# Patient Record
Sex: FEMALE | Race: BLACK OR AFRICAN AMERICAN | NOT HISPANIC OR LATINO | Employment: FULL TIME | URBAN - METROPOLITAN AREA
[De-identification: names, ages, dates, MRNs, and addresses within clinical notes are randomized per-mention and may not be internally consistent; named-entity substitution may affect disease eponyms.]

---

## 2023-03-14 ENCOUNTER — TELEPHONE (OUTPATIENT)
Dept: PRIMARY CARE | Facility: CLINIC | Age: 38
End: 2023-03-14
Payer: COMMERCIAL

## 2023-03-14 NOTE — TELEPHONE ENCOUNTER
Pt calling for advice. Stated she had covid in January and her nose congestion has not gone away and is getting worse. Pt would like to know what JE would advise

## 2023-03-15 DIAGNOSIS — I10 ESSENTIAL (PRIMARY) HYPERTENSION: ICD-10-CM

## 2023-03-15 RX ORDER — LOSARTAN POTASSIUM 100 MG/1
TABLET ORAL
Qty: 30 TABLET | Refills: 5 | Status: SHIPPED | OUTPATIENT
Start: 2023-03-15 | End: 2023-09-15 | Stop reason: SDUPTHER

## 2023-03-16 LAB
FLU A RESULT: NOT DETECTED
FLU B RESULT: NOT DETECTED
SARS-COV-2 RESULT: NOT DETECTED

## 2023-04-02 PROBLEM — H90.3 ASNHL (ASYMMETRICAL SENSORINEURAL HEARING LOSS): Status: ACTIVE | Noted: 2023-04-02

## 2023-04-02 PROBLEM — N64.4 MASTALGIA: Status: ACTIVE | Noted: 2023-04-02

## 2023-04-02 PROBLEM — G47.30 SLEEP APNEA: Status: ACTIVE | Noted: 2023-04-02

## 2023-04-02 PROBLEM — H69.90 ETD (EUSTACHIAN TUBE DYSFUNCTION): Status: ACTIVE | Noted: 2023-04-02

## 2023-04-02 PROBLEM — K21.9 LARYNGOPHARYNGEAL REFLUX (LPR): Status: ACTIVE | Noted: 2023-04-02

## 2023-04-02 PROBLEM — J45.909 ASTHMA (HHS-HCC): Status: ACTIVE | Noted: 2023-04-02

## 2023-04-02 PROBLEM — R09.82 POST-NASAL DRIP: Status: ACTIVE | Noted: 2023-04-02

## 2023-04-02 PROBLEM — J31.0 RHINITIS: Status: ACTIVE | Noted: 2023-04-02

## 2023-04-02 PROBLEM — B96.89 BACTERIAL VAGINOSIS: Status: ACTIVE | Noted: 2023-04-02

## 2023-04-02 PROBLEM — R09.81 NASAL CONGESTION: Status: ACTIVE | Noted: 2023-04-02

## 2023-04-02 PROBLEM — J45.20 INTERMITTENT ASTHMA WITHOUT COMPLICATION (HHS-HCC): Status: ACTIVE | Noted: 2023-04-02

## 2023-04-02 PROBLEM — E55.9 VITAMIN D DEFICIENCY: Status: ACTIVE | Noted: 2023-04-02

## 2023-04-02 PROBLEM — R09.81 NASAL CONGESTION WITH RHINORRHEA: Status: ACTIVE | Noted: 2023-04-02

## 2023-04-02 PROBLEM — J00 NASOPHARYNGITIS: Status: ACTIVE | Noted: 2023-04-02

## 2023-04-02 PROBLEM — M25.519 SHOULDER ARTHRALGIA: Status: ACTIVE | Noted: 2023-04-02

## 2023-04-02 PROBLEM — D21.9 FIBROIDS: Status: ACTIVE | Noted: 2023-04-02

## 2023-04-02 PROBLEM — E66.01 MORBID OBESITY (MULTI): Status: ACTIVE | Noted: 2023-04-02

## 2023-04-02 PROBLEM — F19.959: Status: ACTIVE | Noted: 2023-04-02

## 2023-04-02 PROBLEM — S43.409A SHOULDER SPRAIN: Status: ACTIVE | Noted: 2023-04-02

## 2023-04-02 PROBLEM — E87.6 HYPOKALEMIA: Status: ACTIVE | Noted: 2023-04-02

## 2023-04-02 PROBLEM — R09.81 CONGESTION OF PARANASAL SINUS: Status: ACTIVE | Noted: 2023-04-02

## 2023-04-02 PROBLEM — A59.01 TRICHOMONAL VAGINITIS: Status: ACTIVE | Noted: 2023-04-02

## 2023-04-02 PROBLEM — D72.829 LEUKOCYTOSIS: Status: ACTIVE | Noted: 2023-04-02

## 2023-04-02 PROBLEM — L70.9 ACNE: Status: ACTIVE | Noted: 2023-04-02

## 2023-04-02 PROBLEM — L29.2 VULVAR ITCHING: Status: ACTIVE | Noted: 2023-04-02

## 2023-04-02 PROBLEM — Z97.5 IUD (INTRAUTERINE DEVICE) IN PLACE: Status: ACTIVE | Noted: 2023-04-02

## 2023-04-02 PROBLEM — I10 BENIGN ESSENTIAL HTN: Status: ACTIVE | Noted: 2023-04-02

## 2023-04-02 PROBLEM — R20.2 TINGLING OF BOTH UPPER EXTREMITIES: Status: ACTIVE | Noted: 2023-04-02

## 2023-04-02 PROBLEM — J10.1 INFLUENZA B: Status: ACTIVE | Noted: 2023-04-02

## 2023-04-02 PROBLEM — N60.19 FIBROCYSTIC BREAST: Status: ACTIVE | Noted: 2023-04-02

## 2023-04-02 PROBLEM — R13.10 DYSPHAGIA: Status: ACTIVE | Noted: 2023-04-02

## 2023-04-02 PROBLEM — R29.898 WEAKNESS OF BOTH ARMS: Status: ACTIVE | Noted: 2023-04-02

## 2023-04-02 PROBLEM — N76.0 BACTERIAL VAGINOSIS: Status: ACTIVE | Noted: 2023-04-02

## 2023-04-02 PROBLEM — R06.00 DYSPNEA: Status: ACTIVE | Noted: 2023-04-02

## 2023-04-02 PROBLEM — R10.2 PELVIC PAIN IN FEMALE: Status: ACTIVE | Noted: 2023-04-02

## 2023-04-02 PROBLEM — M53.82 NECK MUSCLE WEAKNESS: Status: ACTIVE | Noted: 2023-04-02

## 2023-04-02 PROBLEM — R05.9 COUGH: Status: ACTIVE | Noted: 2023-04-02

## 2023-04-02 PROBLEM — R03.0 ELEVATED BLOOD PRESSURE READING: Status: ACTIVE | Noted: 2023-04-02

## 2023-04-02 PROBLEM — J06.9 URI (UPPER RESPIRATORY INFECTION): Status: ACTIVE | Noted: 2023-04-02

## 2023-04-02 PROBLEM — R09.81 SINUS CONGESTION: Status: ACTIVE | Noted: 2023-04-02

## 2023-04-02 PROBLEM — J02.9 SORE THROAT: Status: ACTIVE | Noted: 2023-04-02

## 2023-04-02 PROBLEM — N89.8 VAGINAL ODOR: Status: ACTIVE | Noted: 2023-04-02

## 2023-04-02 PROBLEM — H65.91 RIGHT SEROUS OTITIS MEDIA: Status: ACTIVE | Noted: 2023-04-02

## 2023-04-02 PROBLEM — T83.32XA IUD STRINGS LOST: Status: ACTIVE | Noted: 2023-04-02

## 2023-04-02 PROBLEM — K62.5 RECTAL BLEEDING: Status: ACTIVE | Noted: 2023-04-02

## 2023-04-02 PROBLEM — K64.9 HEMORRHOIDS: Status: ACTIVE | Noted: 2023-04-02

## 2023-04-02 PROBLEM — D25.1 INTRAMURAL LEIOMYOMA OF UTERUS: Status: ACTIVE | Noted: 2023-04-02

## 2023-04-02 PROBLEM — G47.33 OSA (OBSTRUCTIVE SLEEP APNEA): Status: ACTIVE | Noted: 2023-04-02

## 2023-04-02 PROBLEM — G62.9 NEUROPATHY: Status: ACTIVE | Noted: 2023-04-02

## 2023-04-02 PROBLEM — J34.89 NASAL CONGESTION WITH RHINORRHEA: Status: ACTIVE | Noted: 2023-04-02

## 2023-04-02 PROBLEM — H91.20 SUDDEN HEARING LOSS: Status: ACTIVE | Noted: 2023-04-02

## 2023-04-02 PROBLEM — R29.3 POSTURE ABNORMALITY: Status: ACTIVE | Noted: 2023-04-02

## 2023-04-02 RX ORDER — FLUTICASONE FUROATE 27.5 UG/1
2 SPRAY, METERED NASAL
COMMUNITY
Start: 2022-06-02

## 2023-04-02 RX ORDER — LEVONORGESTREL 52 MG/1
INTRAUTERINE DEVICE INTRAUTERINE
COMMUNITY
Start: 2021-10-05

## 2023-04-02 RX ORDER — ALBUTEROL SULFATE 90 UG/1
1 AEROSOL, METERED RESPIRATORY (INHALATION) EVERY 4 HOURS PRN
COMMUNITY
Start: 2020-04-20

## 2023-04-02 RX ORDER — CLINDAMYCIN PHOSPHATE AND BENZOYL PEROXIDE 10; 50 MG/G; MG/G
GEL TOPICAL
COMMUNITY
Start: 2022-06-22

## 2023-04-02 RX ORDER — ERGOCALCIFEROL 1.25 MG/1
1 CAPSULE ORAL 2 TIMES WEEKLY
COMMUNITY
Start: 2020-05-05 | End: 2023-09-15 | Stop reason: SDUPTHER

## 2023-04-02 RX ORDER — AZELASTINE 1 MG/ML
2 SPRAY, METERED NASAL DAILY
COMMUNITY
Start: 2022-05-17 | End: 2023-04-11 | Stop reason: SDUPTHER

## 2023-04-02 RX ORDER — AMLODIPINE BESYLATE 5 MG/1
1 TABLET ORAL DAILY
COMMUNITY
Start: 2020-04-20 | End: 2023-04-18

## 2023-04-03 ENCOUNTER — APPOINTMENT (OUTPATIENT)
Dept: PRIMARY CARE | Facility: CLINIC | Age: 38
End: 2023-04-03
Payer: COMMERCIAL

## 2023-04-11 ENCOUNTER — OFFICE VISIT (OUTPATIENT)
Dept: PRIMARY CARE | Facility: CLINIC | Age: 38
End: 2023-04-11
Payer: COMMERCIAL

## 2023-04-11 VITALS
HEART RATE: 74 BPM | HEIGHT: 61 IN | WEIGHT: 230 LBS | BODY MASS INDEX: 43.43 KG/M2 | RESPIRATION RATE: 14 BRPM | DIASTOLIC BLOOD PRESSURE: 74 MMHG | OXYGEN SATURATION: 98 % | SYSTOLIC BLOOD PRESSURE: 126 MMHG

## 2023-04-11 DIAGNOSIS — J32.9 CHRONIC SINUSITIS, UNSPECIFIED LOCATION: ICD-10-CM

## 2023-04-11 DIAGNOSIS — R09.81 NASAL CONGESTION WITH RHINORRHEA: ICD-10-CM

## 2023-04-11 DIAGNOSIS — H69.93 DYSFUNCTION OF BOTH EUSTACHIAN TUBES: ICD-10-CM

## 2023-04-11 DIAGNOSIS — J34.89 NASAL CONGESTION WITH RHINORRHEA: ICD-10-CM

## 2023-04-11 DIAGNOSIS — H69.93 DYSFUNCTION OF BOTH EUSTACHIAN TUBES: Primary | ICD-10-CM

## 2023-04-11 PROCEDURE — 99213 OFFICE O/P EST LOW 20 MIN: CPT | Performed by: FAMILY MEDICINE

## 2023-04-11 RX ORDER — AZELASTINE 1 MG/ML
2 SPRAY, METERED NASAL DAILY
Qty: 30 ML | Refills: 3 | Status: SHIPPED | OUTPATIENT
Start: 2023-04-11 | End: 2023-04-12

## 2023-04-11 RX ORDER — LEVOFLOXACIN 500 MG/1
500 TABLET, FILM COATED ORAL DAILY
Qty: 10 TABLET | Refills: 0 | Status: SHIPPED | OUTPATIENT
Start: 2023-04-11 | End: 2023-04-21

## 2023-04-11 ASSESSMENT — ENCOUNTER SYMPTOMS
ENDOCRINE NEGATIVE: 1
SINUS PAIN: 1
CARDIOVASCULAR NEGATIVE: 1
ALLERGIC/IMMUNOLOGIC NEGATIVE: 1
PSYCHIATRIC NEGATIVE: 1
SINUS PRESSURE: 1
MUSCULOSKELETAL NEGATIVE: 1
CONSTITUTIONAL NEGATIVE: 1
GASTROINTESTINAL NEGATIVE: 1
RESPIRATORY NEGATIVE: 1
EYES NEGATIVE: 1
NEUROLOGICAL NEGATIVE: 1
HEMATOLOGIC/LYMPHATIC NEGATIVE: 1

## 2023-04-11 NOTE — PROGRESS NOTES
"Subjective   Patient ID: Toña Machuca is a 37 y.o. female who presents for Sinusitis.    HPI Pt presents today for sinus pain,congestion and pressure, she states this has been going on since January 2023. Pt is wondering if she can get some allergy testing to see if it might just be allergies.    Review of Systems   Constitutional: Negative.    HENT:  Positive for postnasal drip, sinus pressure and sinus pain.    Eyes: Negative.    Respiratory: Negative.     Cardiovascular: Negative.    Gastrointestinal: Negative.    Endocrine: Negative.    Genitourinary: Negative.    Musculoskeletal: Negative.    Skin: Negative.    Allergic/Immunologic: Negative.    Neurological: Negative.    Hematological: Negative.    Psychiatric/Behavioral: Negative.         Objective   /74 (BP Location: Left arm, Patient Position: Sitting, BP Cuff Size: Adult)   Pulse 74   Resp 14   Ht 1.549 m (5' 1\")   Wt 104 kg (230 lb)   LMP  (LMP Unknown)   SpO2 98%   BMI 43.46 kg/m²     Physical Exam CONSTITUTIONAL- NAD, Pt is A & O x3, Vital signs reviewed per chart.  General Appearance- normal , good hygiene,talks easily  EYES-PERRLA conjunctiva and lids- normal  EARS/NOSE-TM's normal, head normocephalic and atraumatic, naso pharynx-no nasal discharge, no trismus,  oropharynx- normal without exudate  NECK- supple, FROM, without mass  thyroid- NT, normal size, no nodule noted  LYMPH- WNL  CV- RRR without murmur, gallop, or other abnormality.  PULM- CTA bilaterally  Respiratory effort- normal respiratory effort , no retractions or nasal flaring  ABDOMEN- normoactive BS's , soft , NT, no hepatosplenomegaly palpated, or masses. No pulsatile masses noted  extremities no edema,NT  SKIN- no abnormal skin lesions on inspection or palpation  neuro- no focal deficits  PSYCH- pleasant, normal judgement and insight     Nasal mucosa is red and inflamed     Assessment/Plan   Problem List Items Addressed This Visit       ETD (eustachian tube " dysfunction) - Primary    Relevant Medications    azelastine (Astelin) 137 mcg (0.1 %) nasal spray    levoFLOXacin (Levaquin) 500 mg tablet    Other Relevant Orders    Respiratory Allergy Profile IgE    Nasal congestion with rhinorrhea    Relevant Medications    azelastine (Astelin) 137 mcg (0.1 %) nasal spray    levoFLOXacin (Levaquin) 500 mg tablet    Other Relevant Orders    Respiratory Allergy Profile IgE     Other Visit Diagnoses       Chronic sinusitis, unspecified location        Relevant Medications    azelastine (Astelin) 137 mcg (0.1 %) nasal spray    levoFLOXacin (Levaquin) 500 mg tablet    Other Relevant Orders    Respiratory Allergy Profile IgE             Scribe Attestation  By signing my name below, I, Erik Gaines DO  , Scribe   attest that this documentation has been prepared under the direction and in the presence of Erik Gaines DO.

## 2023-04-12 RX ORDER — AZELASTINE 1 MG/ML
2 SPRAY, METERED NASAL DAILY
Qty: 30 ML | Refills: 1 | Status: SHIPPED | OUTPATIENT
Start: 2023-04-12 | End: 2024-03-05 | Stop reason: WASHOUT

## 2023-04-15 DIAGNOSIS — I10 ESSENTIAL (PRIMARY) HYPERTENSION: ICD-10-CM

## 2023-04-18 RX ORDER — AMLODIPINE BESYLATE 5 MG/1
TABLET ORAL
Qty: 30 TABLET | Refills: 3 | Status: SHIPPED | OUTPATIENT
Start: 2023-04-18 | End: 2023-09-15 | Stop reason: SDUPTHER

## 2023-05-17 ENCOUNTER — OFFICE VISIT (OUTPATIENT)
Dept: PRIMARY CARE | Facility: CLINIC | Age: 38
End: 2023-05-17
Payer: COMMERCIAL

## 2023-05-17 VITALS
BODY MASS INDEX: 43.99 KG/M2 | HEART RATE: 74 BPM | HEIGHT: 61 IN | RESPIRATION RATE: 16 BRPM | WEIGHT: 233 LBS | SYSTOLIC BLOOD PRESSURE: 126 MMHG | DIASTOLIC BLOOD PRESSURE: 72 MMHG

## 2023-05-17 DIAGNOSIS — T14.8XXA AVULSION FRACTURE: ICD-10-CM

## 2023-05-17 DIAGNOSIS — G56.00 CARPAL TUNNEL SYNDROME, UNSPECIFIED LATERALITY: ICD-10-CM

## 2023-05-17 DIAGNOSIS — S63.649A GAMEKEEPER THUMB: ICD-10-CM

## 2023-05-17 PROBLEM — R09.81 SINUS CONGESTION: Status: RESOLVED | Noted: 2023-04-02 | Resolved: 2023-05-17

## 2023-05-17 PROBLEM — J10.1 INFLUENZA B: Status: RESOLVED | Noted: 2023-04-02 | Resolved: 2023-05-17

## 2023-05-17 PROBLEM — L29.2 VULVAR ITCHING: Status: RESOLVED | Noted: 2023-04-02 | Resolved: 2023-05-17

## 2023-05-17 PROBLEM — K21.9 LARYNGOPHARYNGEAL REFLUX (LPR): Status: RESOLVED | Noted: 2023-04-02 | Resolved: 2023-05-17

## 2023-05-17 PROBLEM — H91.20 SUDDEN HEARING LOSS: Status: RESOLVED | Noted: 2023-04-02 | Resolved: 2023-05-17

## 2023-05-17 PROBLEM — B96.89 BACTERIAL VAGINOSIS: Status: RESOLVED | Noted: 2023-04-02 | Resolved: 2023-05-17

## 2023-05-17 PROBLEM — T83.32XA IUD STRINGS LOST: Status: RESOLVED | Noted: 2023-04-02 | Resolved: 2023-05-17

## 2023-05-17 PROBLEM — F19.959: Status: RESOLVED | Noted: 2023-04-02 | Resolved: 2023-05-17

## 2023-05-17 PROBLEM — R09.81 NASAL CONGESTION: Status: RESOLVED | Noted: 2023-04-02 | Resolved: 2023-05-17

## 2023-05-17 PROBLEM — L70.9 ACNE: Status: RESOLVED | Noted: 2023-04-02 | Resolved: 2023-05-17

## 2023-05-17 PROBLEM — J02.9 SORE THROAT: Status: RESOLVED | Noted: 2023-04-02 | Resolved: 2023-05-17

## 2023-05-17 PROBLEM — R10.2 PELVIC PAIN IN FEMALE: Status: RESOLVED | Noted: 2023-04-02 | Resolved: 2023-05-17

## 2023-05-17 PROBLEM — K62.5 RECTAL BLEEDING: Status: RESOLVED | Noted: 2023-04-02 | Resolved: 2023-05-17

## 2023-05-17 PROBLEM — R09.81 NASAL CONGESTION WITH RHINORRHEA: Status: RESOLVED | Noted: 2023-04-02 | Resolved: 2023-05-17

## 2023-05-17 PROBLEM — R03.0 ELEVATED BLOOD PRESSURE READING: Status: RESOLVED | Noted: 2023-04-02 | Resolved: 2023-05-17

## 2023-05-17 PROBLEM — R09.82 POST-NASAL DRIP: Status: RESOLVED | Noted: 2023-04-02 | Resolved: 2023-05-17

## 2023-05-17 PROBLEM — M25.519 SHOULDER ARTHRALGIA: Status: RESOLVED | Noted: 2023-04-02 | Resolved: 2023-05-17

## 2023-05-17 PROBLEM — R29.3 POSTURE ABNORMALITY: Status: RESOLVED | Noted: 2023-04-02 | Resolved: 2023-05-17

## 2023-05-17 PROBLEM — R29.898 WEAKNESS OF BOTH ARMS: Status: RESOLVED | Noted: 2023-04-02 | Resolved: 2023-05-17

## 2023-05-17 PROBLEM — D21.9 FIBROIDS: Status: RESOLVED | Noted: 2023-04-02 | Resolved: 2023-05-17

## 2023-05-17 PROBLEM — R06.00 DYSPNEA: Status: RESOLVED | Noted: 2023-04-02 | Resolved: 2023-05-17

## 2023-05-17 PROBLEM — S43.409A SHOULDER SPRAIN: Status: RESOLVED | Noted: 2023-04-02 | Resolved: 2023-05-17

## 2023-05-17 PROBLEM — N76.0 BACTERIAL VAGINOSIS: Status: RESOLVED | Noted: 2023-04-02 | Resolved: 2023-05-17

## 2023-05-17 PROBLEM — A59.01 TRICHOMONAL VAGINITIS: Status: RESOLVED | Noted: 2023-04-02 | Resolved: 2023-05-17

## 2023-05-17 PROBLEM — H69.90 ETD (EUSTACHIAN TUBE DYSFUNCTION): Status: RESOLVED | Noted: 2023-04-02 | Resolved: 2023-05-17

## 2023-05-17 PROBLEM — M53.82 NECK MUSCLE WEAKNESS: Status: RESOLVED | Noted: 2023-04-02 | Resolved: 2023-05-17

## 2023-05-17 PROBLEM — J34.89 NASAL CONGESTION WITH RHINORRHEA: Status: RESOLVED | Noted: 2023-04-02 | Resolved: 2023-05-17

## 2023-05-17 PROBLEM — N60.19 FIBROCYSTIC BREAST: Status: RESOLVED | Noted: 2023-04-02 | Resolved: 2023-05-17

## 2023-05-17 PROBLEM — R05.9 COUGH: Status: RESOLVED | Noted: 2023-04-02 | Resolved: 2023-05-17

## 2023-05-17 PROBLEM — G47.33 OSA (OBSTRUCTIVE SLEEP APNEA): Status: RESOLVED | Noted: 2023-04-02 | Resolved: 2023-05-17

## 2023-05-17 PROBLEM — N64.4 MASTALGIA: Status: RESOLVED | Noted: 2023-04-02 | Resolved: 2023-05-17

## 2023-05-17 PROBLEM — N89.8 VAGINAL ODOR: Status: RESOLVED | Noted: 2023-04-02 | Resolved: 2023-05-17

## 2023-05-17 PROBLEM — J45.909 ASTHMA (HHS-HCC): Status: RESOLVED | Noted: 2023-04-02 | Resolved: 2023-05-17

## 2023-05-17 PROBLEM — D72.829 LEUKOCYTOSIS: Status: RESOLVED | Noted: 2023-04-02 | Resolved: 2023-05-17

## 2023-05-17 PROBLEM — R09.81 CONGESTION OF PARANASAL SINUS: Status: RESOLVED | Noted: 2023-04-02 | Resolved: 2023-05-17

## 2023-05-17 PROCEDURE — 99213 OFFICE O/P EST LOW 20 MIN: CPT | Performed by: FAMILY MEDICINE

## 2023-05-17 RX ORDER — SPIRONOLACTONE 50 MG/1
50 TABLET, FILM COATED ORAL DAILY
COMMUNITY
Start: 2023-04-27 | End: 2023-09-15

## 2023-05-17 ASSESSMENT — ENCOUNTER SYMPTOMS
RESPIRATORY NEGATIVE: 1
GASTROINTESTINAL NEGATIVE: 1
PSYCHIATRIC NEGATIVE: 1
MUSCULOSKELETAL NEGATIVE: 1
CARDIOVASCULAR NEGATIVE: 1
ENDOCRINE NEGATIVE: 1
NEUROLOGICAL NEGATIVE: 1
CONSTITUTIONAL NEGATIVE: 1
ALLERGIC/IMMUNOLOGIC NEGATIVE: 1
EYES NEGATIVE: 1
HEMATOLOGIC/LYMPHATIC NEGATIVE: 1

## 2023-05-17 NOTE — PROGRESS NOTES
"Subjective   Patient ID: Toña Machuca is a 37 y.o. female who presents for Left thumb.    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    Review of Systems   Constitutional: Negative.    HENT: Negative.     Eyes: Negative.    Respiratory: Negative.     Cardiovascular: Negative.    Gastrointestinal: Negative.    Endocrine: Negative.    Genitourinary: Negative.    Musculoskeletal: Negative.    Allergic/Immunologic: Negative.    Neurological: Negative.    Hematological: Negative.    Psychiatric/Behavioral: Negative.         Objective   /72 (BP Location: Left arm, Patient Position: Sitting, BP Cuff Size: Adult)   Pulse 74   Resp 16   Ht 1.549 m (5' 1\")   Wt 106 kg (233 lb)   BMI 44.02 kg/m²     Physical Exam CONSTITUTIONAL- NAD, Pt is A & O x3, Vital signs reviewed per chart.  General Appearance- normal , good hygiene,talks easily  EYES-PERRLA conjunctiva and lids- normal  EARS/NOSE-TM's normal, head normocephalic and atraumatic, naso pharynx-no nasal discharge, no trismus,  oropharynx- normal without exudate  NECK- supple, FROM, without mass  thyroid- NT, normal size, no nodule noted  LYMPH- WNL  CV- RRR without murmur, gallop, or other abnormality.  PULM- CTA bilaterally  Respiratory effort- normal respiratory effort , no retractions or nasal flaring  ABDOMEN- normoactive BS's , soft , NT, no hepatosplenomegaly palpated, or masses. No pulsatile masses noted  extremities no edema,NT  SKIN- no abnormal skin lesions on inspection or palpation  neuro- no focal deficits  PSYCH- pleasant, normal judgement and insight      Assessment/Plan   Problem List Items Addressed This Visit    None  Visit Diagnoses       Carpal tunnel syndrome, unspecified laterality        Gamekeeper thumb        Relevant Orders    Referral to " Orthopaedic Surgery             Scribe Attestation  By signing my name below, I, Bisi Jennings MA  , Scribe   attest that this documentation has been prepared under the direction and in the presence of Erik Gaines DO.

## 2023-09-15 ENCOUNTER — OFFICE VISIT (OUTPATIENT)
Dept: PRIMARY CARE | Facility: CLINIC | Age: 38
End: 2023-09-15
Payer: COMMERCIAL

## 2023-09-15 VITALS
BODY MASS INDEX: 42.86 KG/M2 | RESPIRATION RATE: 18 BRPM | HEART RATE: 74 BPM | WEIGHT: 227 LBS | SYSTOLIC BLOOD PRESSURE: 128 MMHG | OXYGEN SATURATION: 98 % | DIASTOLIC BLOOD PRESSURE: 78 MMHG | HEIGHT: 61 IN

## 2023-09-15 DIAGNOSIS — K21.9 GASTROESOPHAGEAL REFLUX DISEASE WITHOUT ESOPHAGITIS: ICD-10-CM

## 2023-09-15 DIAGNOSIS — E55.9 VITAMIN D DEFICIENCY: ICD-10-CM

## 2023-09-15 DIAGNOSIS — I10 ESSENTIAL (PRIMARY) HYPERTENSION: ICD-10-CM

## 2023-09-15 DIAGNOSIS — E66.01 CLASS 3 SEVERE OBESITY WITH SERIOUS COMORBIDITY AND BODY MASS INDEX (BMI) OF 40.0 TO 44.9 IN ADULT, UNSPECIFIED OBESITY TYPE (MULTI): Primary | ICD-10-CM

## 2023-09-15 PROBLEM — L91.0 KELOID: Status: ACTIVE | Noted: 2017-10-11

## 2023-09-15 PROBLEM — G56.03 BILATERAL CARPAL TUNNEL SYNDROME: Status: ACTIVE | Noted: 2023-09-15

## 2023-09-15 PROBLEM — H65.91 RIGHT SEROUS OTITIS MEDIA: Status: RESOLVED | Noted: 2023-04-02 | Resolved: 2023-09-15

## 2023-09-15 PROBLEM — J06.9 URI (UPPER RESPIRATORY INFECTION): Status: RESOLVED | Noted: 2023-04-02 | Resolved: 2023-09-15

## 2023-09-15 PROBLEM — J00 NASOPHARYNGITIS: Status: RESOLVED | Noted: 2023-04-02 | Resolved: 2023-09-15

## 2023-09-15 PROBLEM — R20.2 TINGLING OF BOTH UPPER EXTREMITIES: Status: RESOLVED | Noted: 2023-04-02 | Resolved: 2023-09-15

## 2023-09-15 PROBLEM — S63.629A COMPLETE TEAR OF ULNAR COLLATERAL LIGAMENT OF INTERPHALANGEAL JOINT OF THUMB: Status: ACTIVE | Noted: 2023-09-15

## 2023-09-15 PROBLEM — J31.0 RHINITIS: Status: RESOLVED | Noted: 2023-04-02 | Resolved: 2023-09-15

## 2023-09-15 PROCEDURE — 99214 OFFICE O/P EST MOD 30 MIN: CPT | Performed by: FAMILY MEDICINE

## 2023-09-15 RX ORDER — PANTOPRAZOLE SODIUM 40 MG/1
40 TABLET, DELAYED RELEASE ORAL DAILY
Qty: 30 TABLET | Refills: 5 | Status: SHIPPED | OUTPATIENT
Start: 2023-09-15 | End: 2024-04-29

## 2023-09-15 RX ORDER — LOSARTAN POTASSIUM 100 MG/1
100 TABLET ORAL DAILY
Qty: 30 TABLET | Refills: 11 | Status: SHIPPED | OUTPATIENT
Start: 2023-09-15

## 2023-09-15 RX ORDER — ERGOCALCIFEROL 1.25 MG/1
1 CAPSULE ORAL 2 TIMES WEEKLY
Qty: 24 CAPSULE | Refills: 3 | Status: SHIPPED | OUTPATIENT
Start: 2023-09-18

## 2023-09-15 RX ORDER — PHENTERMINE HYDROCHLORIDE 37.5 MG/1
37.5 TABLET ORAL
Qty: 30 TABLET | Refills: 0 | Status: SHIPPED | OUTPATIENT
Start: 2023-09-15

## 2023-09-15 RX ORDER — AMLODIPINE BESYLATE 5 MG/1
5 TABLET ORAL DAILY
Qty: 30 TABLET | Refills: 11 | Status: SHIPPED | OUTPATIENT
Start: 2023-09-15

## 2023-09-15 NOTE — PROGRESS NOTES
Subjective   Patient ID: Toña Machuca is a 38 y.o. female who presents for Weight Gain and Nausea.    HPI Pt would like to discuss something for weight loss today.    Nausea ,Pt states she is having some discomfort in her stomach.Pt states it sometimes she is nausous and would like to discuss this today,ongoing since July 2023,but increasing getting worse.    12 Systems have been reviewed as follows.  Constitutional: Fever, weight gain, weight loss, appetite change, night sweats, fatigue, chills.  Eyes : blurry, double vision, vision, loss, tearing, redness, pain, sensitivity to light, glaucoma.  Ears: nose, mouth, and throat: Hearing loss, ringing in the ears, ear pain, nasal congestion, nasal drainage, nosebleeds, mouth, throat, irritation tooth problem.  Cardiovascular: chest pain, pressure, heart racing, palpitations, sweating, leg swelling, high or low blood pressure  Pulmonary: Cough, yellow or green sputum, blood and sputum, shortness of breath, wheezing  Gastrointestinal: Nausea, vomiting, diarrhea, constipation, pain, blood in stool, or vomitus, heartburn, difficulty swallowing  Genitourinary: incontinence, abnormal bleeding, abnormal discharge, urinary frequency, urinary hesitancy, pain, impotence sexual problem, infection, urinary retention  Musculoskeletal: Pain, stiffness, joint, redness or warmth, arthritis, back pain, weakness, muscle wasting, sprain or fracture  Neuro: Weight weakness, dizziness, change in voice, change in taste change in vision, change in hearing, loss, or change of sensation, trouble walking, balance problems coordination problems, shaking, speech problem  Endocrine: cold or heat intolerance, blood sugar problem, weight gain or loss missed periods hot flashes, sweats, change in body hair, change in libido, increased thirst, increased urination  Heme/lymph: Swelling, bleeding, problem anemia, bruising, enlarged lymph nodes  Allergic/immunologic: H. plus nasal drip, watery  "itchy eyes, nasal drainage, immunosuppressed  The above were reviewed and noted negative except as noted in HPI and Problem List.      Objective   /78 (BP Location: Left arm, Patient Position: Sitting, BP Cuff Size: Small adult)   Pulse 74   Resp 18   Ht 1.549 m (5' 1\")   Wt 103 kg (227 lb)   SpO2 98%   BMI 42.89 kg/m²     Physical Exam CONSTITUTIONAL- NAD, Pt is A & O x3, Vital signs reviewed per chart.  General Appearance- normal , good hygiene,talks easily  EYES-PERRLA conjunctiva and lids- normal  EARS/NOSE-TM's normal, head normocephalic and atraumatic, naso pharynx-no nasal discharge, no trismus,  oropharynx- normal without exudate  NECK- supple, FROM, without mass  thyroid- NT, normal size, no nodule noted  LYMPH- WNL  CV- RRR without murmur, gallop, or other abnormality.  PULM- CTA bilaterally  Respiratory effort- normal respiratory effort , no retractions or nasal flaring  ABDOMEN- normoactive BS's , soft , NT, no hepatosplenomegaly palpated, or masses. No pulsatile masses noted  extremities no edema,NT  SKIN- no abnormal skin lesions on inspection or palpation  neuro- no focal deficits  PSYCH- pleasant, normal judgement and insight     Assessment/Plan   Problem List Items Addressed This Visit       Vitamin D deficiency    Relevant Medications    ergocalciferol (Vitamin D-2) 1.25 MG (00848 UT) capsule     Other Visit Diagnoses       Class 3 severe obesity with serious comorbidity and body mass index (BMI) of 40.0 to 44.9 in adult, unspecified obesity type (CMS/HCC)    -  Primary    Relevant Medications    phentermine (Adipex-P) 37.5 mg tablet    Essential (primary) hypertension        Relevant Medications    amLODIPine (Norvasc) 5 mg tablet    losartan (Cozaar) 100 mg tablet    Gastroesophageal reflux disease without esophagitis        Relevant Medications    pantoprazole (ProtoNix) 40 mg EC tablet             Scribe Attestation  By signing my name below, Erik RESENDIZ DO  , Scribe   attest " that this documentation has been prepared under the direction and in the presence of Erik Gaines DO.

## 2023-10-13 ENCOUNTER — APPOINTMENT (OUTPATIENT)
Dept: PRIMARY CARE | Facility: CLINIC | Age: 38
End: 2023-10-13
Payer: COMMERCIAL

## 2024-01-26 ENCOUNTER — OFFICE VISIT (OUTPATIENT)
Dept: PRIMARY CARE | Facility: CLINIC | Age: 39
End: 2024-01-26
Payer: COMMERCIAL

## 2024-01-26 VITALS
WEIGHT: 231 LBS | SYSTOLIC BLOOD PRESSURE: 126 MMHG | HEART RATE: 93 BPM | HEIGHT: 61 IN | OXYGEN SATURATION: 99 % | BODY MASS INDEX: 43.61 KG/M2 | DIASTOLIC BLOOD PRESSURE: 72 MMHG | TEMPERATURE: 98.1 F

## 2024-01-26 DIAGNOSIS — J01.90 ACUTE NON-RECURRENT SINUSITIS, UNSPECIFIED LOCATION: Primary | ICD-10-CM

## 2024-01-26 DIAGNOSIS — R05.1 ACUTE COUGH: ICD-10-CM

## 2024-01-26 PROCEDURE — 99213 OFFICE O/P EST LOW 20 MIN: CPT | Performed by: NURSE PRACTITIONER

## 2024-01-26 RX ORDER — DOXYCYCLINE 100 MG/1
100 CAPSULE ORAL 2 TIMES DAILY
Qty: 20 CAPSULE | Refills: 0 | Status: SHIPPED | OUTPATIENT
Start: 2024-01-26 | End: 2024-02-05

## 2024-01-26 ASSESSMENT — ENCOUNTER SYMPTOMS
DIZZINESS: 0
SORE THROAT: 0
MYALGIAS: 0
FEVER: 0
COUGH: 1
OCCASIONAL FEELINGS OF UNSTEADINESS: 0
SINUS PAIN: 1
LOSS OF SENSATION IN FEET: 0
PALPITATIONS: 0
SINUS PRESSURE: 1
HEADACHES: 1
CHILLS: 0
DEPRESSION: 0
WHEEZING: 0
SHORTNESS OF BREATH: 0

## 2024-01-26 NOTE — PATIENT INSTRUCTIONS
Today you were seen for a sinus infection.  Start antibiotic as directed and take until gone.  Increase rest and fluids.  Continue with OTC Flonase per package instructions.  Follow-up with your primary care provider in 1 week with any persisting symptoms, or sooner with any additional concerns.  If developing any severe or worsening symptoms, chest pain, trouble breathing, confusion or lethargy, proceed to emergency department for further evaluation.

## 2024-01-26 NOTE — PROGRESS NOTES
"Subjective   Patient ID: Toña Machuca is a 38 y.o. female who presents for Nasal Congestion and Ear Fullness.    HPI   Pt presents today with c/o nasal congestion and BL ear pain with fullness. Pt states she has tried astelin and flonase with no relief. Pt states that the saline nose spray she used helped but did not relive the pressure in her ears. Onset 1 week.     38-year-old female presents today complaining of nasal congestion, sinus pain/pressure and bilateral ear fullness with left ear pain.  She states that her symptoms have been persisting for more than 10 days.  She does feel that she is having some trouble hearing out of her ears.  She does have a mild cough.  No sore throat.  She denies any chest pain or trouble breathing.  She was initially running fevers, that has resolved.  She denies smoking or vaping.  She does report a history of asthma.  Her daughter tested positive for COVID, her test resulted negative.  She has been trying OTC Astelin, Flonase and saline nasal sprays with little relief.      Review of Systems   Constitutional:  Negative for chills and fever.   HENT:  Positive for congestion, ear pain, hearing loss, sinus pressure and sinus pain. Negative for sore throat.    Respiratory:  Positive for cough. Negative for shortness of breath and wheezing.    Cardiovascular:  Negative for chest pain and palpitations.   Musculoskeletal:  Negative for myalgias.   Neurological:  Positive for headaches. Negative for dizziness.       Objective   /72 (BP Location: Left arm, Patient Position: Sitting, BP Cuff Size: Large adult)   Pulse 93   Temp 36.7 °C (98.1 °F)   Ht 1.549 m (5' 1\")   Wt 105 kg (231 lb)   SpO2 99%   BMI 43.65 kg/m²     Physical Exam  Vitals and nursing note reviewed.   Constitutional:       General: She is not in acute distress.     Appearance: Normal appearance.   HENT:      Right Ear: Tympanic membrane normal.      Left Ear: Tympanic membrane normal.      Nose: " Congestion present.      Right Sinus: Maxillary sinus tenderness and frontal sinus tenderness present.      Left Sinus: Maxillary sinus tenderness present.      Mouth/Throat:      Pharynx: No oropharyngeal exudate or posterior oropharyngeal erythema.   Eyes:      Conjunctiva/sclera: Conjunctivae normal.   Cardiovascular:      Rate and Rhythm: Normal rate and regular rhythm.   Pulmonary:      Effort: Pulmonary effort is normal.      Breath sounds: Normal breath sounds. No wheezing, rhonchi or rales.   Neurological:      Mental Status: She is alert.   Psychiatric:         Mood and Affect: Mood normal.         Assessment/Plan   Problem List Items Addressed This Visit    None  Visit Diagnoses         Codes    Acute non-recurrent sinusitis, unspecified location    -  Primary J01.90    Relevant Medications    doxycycline (Vibramycin) 100 mg capsule    BMI 40.0-44.9, adult (CMS/[facility])     Z68.41    Acute cough     R05.1        Sinusitis: Will treat with doxycycline due to penicillin and sulfa allergies.  Increase rest and fluids.  Continue with Flonase.  Follow-up with PCP in 1 week with any persisting symptoms, or sooner with any additional concerns.

## 2024-02-23 ENCOUNTER — OFFICE VISIT (OUTPATIENT)
Dept: PRIMARY CARE | Facility: CLINIC | Age: 39
End: 2024-02-23
Payer: COMMERCIAL

## 2024-02-23 VITALS
DIASTOLIC BLOOD PRESSURE: 78 MMHG | WEIGHT: 234.6 LBS | OXYGEN SATURATION: 97 % | HEIGHT: 61 IN | RESPIRATION RATE: 16 BRPM | SYSTOLIC BLOOD PRESSURE: 125 MMHG | BODY MASS INDEX: 44.29 KG/M2 | HEART RATE: 91 BPM | TEMPERATURE: 97.7 F

## 2024-02-23 DIAGNOSIS — Z91.013 ALLERGY TO SHELLFISH: ICD-10-CM

## 2024-02-23 DIAGNOSIS — T78.40XA ALLERGIC REACTION, INITIAL ENCOUNTER: Primary | ICD-10-CM

## 2024-02-23 DIAGNOSIS — R68.89 SENSATION OF SWOLLEN THROAT: ICD-10-CM

## 2024-02-23 PROCEDURE — 99212 OFFICE O/P EST SF 10 MIN: CPT | Performed by: NURSE PRACTITIONER

## 2024-02-23 RX ORDER — EPINEPHRINE 0.3 MG/.3ML
1 INJECTION SUBCUTANEOUS AS NEEDED
Qty: 1 EACH | Refills: 1 | Status: SHIPPED | OUTPATIENT
Start: 2024-02-23 | End: 2024-02-24

## 2024-02-23 RX ORDER — METHYLPREDNISOLONE 4 MG/1
TABLET ORAL
Qty: 21 TABLET | Refills: 0 | Status: SHIPPED | OUTPATIENT
Start: 2024-02-23 | End: 2024-03-01

## 2024-02-23 ASSESSMENT — PATIENT HEALTH QUESTIONNAIRE - PHQ9
SUM OF ALL RESPONSES TO PHQ9 QUESTIONS 1 AND 2: 0
2. FEELING DOWN, DEPRESSED OR HOPELESS: NOT AT ALL
1. LITTLE INTEREST OR PLEASURE IN DOING THINGS: NOT AT ALL

## 2024-02-23 NOTE — PROGRESS NOTES
Subjective   Patient ID: Toña Machuca is a 38 y.o. female who is here today due to allergic reaction from shellfish.    HPI  Patient is a 38 y.o. female who CONSULTED AT [facility] today. Patient is with complaints of swelling and itching on the face (around the lips), intermittent sensation of throat swelling (gone now), and itching of the throat. Symptoms started last night after she stayed with friends at [employer]. She knew she has history of shellfish allergy so she did not ate nor drink anything from the restaurant. She has some intermittent shortness of breath since last night. Symptoms decreased after intake of Benadryl. She denies having chest pain, wheezing, change in voice, nor shortness of breath at present.    Review of Systems  General: no weight loss, generally healthy, no fatigue  Head: (+) swelling and itching on the face (around the lips), no headaches / sinus pain, no vertigo, no injury  Eyes: no diplopia, no tearing, no pain,   Ears: no change in hearing, no tinnitus, no bleeding, no vertigo  Mouth: (+) hx of intermittent sensation of throat swelling, (+) itching of the throat, no dental difficulties, no gingival bleeding, no sore throat, no loss of sense of taste  Nose: no congestion, no  discharge, no bleeding, no obstruction, no loss of sense of smell  Neck: no stiffness, no pain, no tenderness, no masses, no bruit  Pulmonary: (+) intermittent dyspnea, no wheezing, no hemoptysis, no cough  Cardiovascular: no chest pain, no palpitations, no syncope, no orthopnea  Gastrointestinal: no change in appetite, no dysphagia, no abdominal pains, no diarrhea, no emesis, no melena  Genito Urinary: no dysuria, no urinary urgency, no nocturia, no incontinence, no change in nature of urine  Musculoskeletal: no muscle ache, no joint pain, no limitation of range of motion, no paresthesia, no numbness  Constitutional: no fever, no chills, no night sweats    Objective    Physical Exam  General: ambulatory, in no acute distress  Head: normocephalic, no lesions  Eyes: pink palpebral conjunctiva, anicteric sclerae, PERRLA, EOM's full  Ears: clear external auditory canals, no ear discharge, no bleeding from the ears, tympanic membrane intact  Nose: nasal mucosa normal, no nasal discharge, no bleeding, no obstruction  Throat: clear, no exudate, no lesions  Mouth: (+) slight swelling, erythema, and pruritus of the skin around the mouth,  Neck: supple, no masses, no bruits  Chest: symmetrical chest expansion, no lagging, no retractions, clear breath sounds, no rales, no wheezes  Heart: normal rate, regular rhythm, no heaves, no thrills, no murmurs  Abdomen: soft, non-tender, normoactive bowel sounds, no mass palpated  Musculoskeletal: no limitation of range of motion, no paralysis, no deformity  Extremities: full and equal peripheral pulses, no edema,     Assessment/Plan   Problem List Items Addressed This Visit    None  Visit Diagnoses         Codes    Allergic reaction, initial encounter    -  Primary T78.40XA    Relevant Medications    methylPREDNISolone (Medrol Dospak) 4 mg tablets    EPINEPHrine (Epipen) 0.3 mg/0.3 mL injection syringe    Sensation of swollen throat     R68.89    Relevant Medications    methylPREDNISolone (Medrol Dospak) 4 mg tablets    EPINEPHrine (Epipen) 0.3 mg/0.3 mL injection syringe    Allergy to shellfish     Z91.013    Relevant Medications    methylPREDNISolone (Medrol Dospak) 4 mg tablets    EPINEPHrine (Epipen) 0.3 mg/0.3 mL injection syringe        DISCHARGE SUMMARY:   Patient was seen and examined. Diagnosis, treatment, treatment options, and possible complications of today's illness discussed and explained to patient. Patient to take medication/s associated with this visit. Advised avoidance of known or suspected allergen. Advised hypoallergenic diet. Advised to come back if with worsening or persistent symptoms. Advised to come back if there is  wheezing, change in voice quality, shortness of breath or chest pain. Patient verbalized understanding of plan of care.    Patient to come back in 7 - 10 days if needed for worsening symptoms.           SHIRIN Pablo-CNP 02/23/24 12:31 PM

## 2024-02-23 NOTE — PROGRESS NOTES
"Subjective   Patient ID: Toña Machuca is a 38 y.o. female who presents for Allergic Reaction.      Symptoms: food poisoning, swelling burning and itching from shell fish  Length of symptoms: this morning  OTC: benadryl  Related information:    HPI     Review of Systems    Objective   /78   Pulse 91   Temp 36.5 °C (97.7 °F)   Resp 16   Ht 1.549 m (5' 1\")   Wt 106 kg (234 lb 9.6 oz)   SpO2 97%   BMI 44.33 kg/m²     Physical Exam    Assessment/Plan          "

## 2024-02-24 ASSESSMENT — ENCOUNTER SYMPTOMS
DEPRESSION: 0
LOSS OF SENSATION IN FEET: 0
OCCASIONAL FEELINGS OF UNSTEADINESS: 0

## 2024-02-24 ASSESSMENT — PATIENT HEALTH QUESTIONNAIRE - PHQ9
1. LITTLE INTEREST OR PLEASURE IN DOING THINGS: NOT AT ALL
2. FEELING DOWN, DEPRESSED OR HOPELESS: NOT AT ALL
SUM OF ALL RESPONSES TO PHQ9 QUESTIONS 1 AND 2: 0
2. FEELING DOWN, DEPRESSED OR HOPELESS: NOT AT ALL
1. LITTLE INTEREST OR PLEASURE IN DOING THINGS: NOT AT ALL
SUM OF ALL RESPONSES TO PHQ9 QUESTIONS 1 AND 2: 0

## 2024-03-05 ENCOUNTER — OFFICE VISIT (OUTPATIENT)
Dept: OTOLARYNGOLOGY | Facility: CLINIC | Age: 39
End: 2024-03-05
Payer: COMMERCIAL

## 2024-03-05 ENCOUNTER — CLINICAL SUPPORT (OUTPATIENT)
Dept: AUDIOLOGY | Facility: CLINIC | Age: 39
End: 2024-03-05
Payer: COMMERCIAL

## 2024-03-05 VITALS
DIASTOLIC BLOOD PRESSURE: 83 MMHG | HEIGHT: 61 IN | WEIGHT: 237 LBS | TEMPERATURE: 98.2 F | SYSTOLIC BLOOD PRESSURE: 137 MMHG | BODY MASS INDEX: 44.75 KG/M2

## 2024-03-05 DIAGNOSIS — H90.41 SENSORINEURAL HEARING LOSS (SNHL) OF RIGHT EAR WITH UNRESTRICTED HEARING OF LEFT EAR: ICD-10-CM

## 2024-03-05 DIAGNOSIS — H61.22 IMPACTED CERUMEN OF LEFT EAR: ICD-10-CM

## 2024-03-05 DIAGNOSIS — J30.89 ALLERGIC RHINITIS DUE TO OTHER ALLERGIC TRIGGER, UNSPECIFIED SEASONALITY: Primary | ICD-10-CM

## 2024-03-05 DIAGNOSIS — H93.291 ABNORMAL AUDITORY PERCEPTION OF RIGHT EAR: ICD-10-CM

## 2024-03-05 DIAGNOSIS — H93.13 TINNITUS OF BOTH EARS: ICD-10-CM

## 2024-03-05 DIAGNOSIS — H90.3 ASNHL (ASYMMETRICAL SENSORINEURAL HEARING LOSS): Primary | ICD-10-CM

## 2024-03-05 PROCEDURE — 1036F TOBACCO NON-USER: CPT

## 2024-03-05 PROCEDURE — 99214 OFFICE O/P EST MOD 30 MIN: CPT

## 2024-03-05 PROCEDURE — 3075F SYST BP GE 130 - 139MM HG: CPT

## 2024-03-05 PROCEDURE — 69210 REMOVE IMPACTED EAR WAX UNI: CPT

## 2024-03-05 PROCEDURE — 92557 COMPREHENSIVE HEARING TEST: CPT | Performed by: AUDIOLOGIST

## 2024-03-05 PROCEDURE — 92550 TYMPANOMETRY & REFLEX THRESH: CPT | Performed by: AUDIOLOGIST

## 2024-03-05 PROCEDURE — 3008F BODY MASS INDEX DOCD: CPT

## 2024-03-05 PROCEDURE — 3079F DIAST BP 80-89 MM HG: CPT

## 2024-03-05 RX ORDER — TRIAMCINOLONE ACETONIDE 55 UG/1
1 SPRAY, METERED NASAL 2 TIMES DAILY
Qty: 16.5 G | Refills: 11 | Status: SHIPPED | OUTPATIENT
Start: 2024-03-05 | End: 2025-03-05

## 2024-03-05 ASSESSMENT — PAIN - FUNCTIONAL ASSESSMENT: PAIN_FUNCTIONAL_ASSESSMENT: 0-10

## 2024-03-05 ASSESSMENT — PATIENT HEALTH QUESTIONNAIRE - PHQ9
1. LITTLE INTEREST OR PLEASURE IN DOING THINGS: NOT AT ALL
SUM OF ALL RESPONSES TO PHQ9 QUESTIONS 1 AND 2: 0
2. FEELING DOWN, DEPRESSED OR HOPELESS: NOT AT ALL

## 2024-03-05 ASSESSMENT — PAIN SCALES - GENERAL: PAINLEVEL_OUTOF10: 0 - NO PAIN

## 2024-03-05 ASSESSMENT — ENCOUNTER SYMPTOMS: OCCASIONAL FEELINGS OF UNSTEADINESS: 0

## 2024-03-05 NOTE — PATIENT INSTRUCTIONS
NASACORT NASAL STEROID SPRAY INSTRUCTIONS: Please use the recommended topical nasal spray as directed. BE SURE TO POINT THE SPRAY TOWARDS THE CORNER OF THE EYE ON THE SAME SIDE NOSTRIL. This will ensure you are treating the appropriate parts of your nose that are swollen or inflamed. This medication will take upwards of one month before you notice full benefit. You MUST use it every day for it to be effective. This medication may be purchase over-the-counter or prescription may be submitted upon request.      SINUS/ NASAL IRRIGATION INSTRUCTIONS:  What is nasal irrigation and how does it help? Nasal irrigation is really as simple as running a gentle saline solution through your nasal passages and sinuses. Irrigation is very helpful in patients with sinus problems because it helps the nose to flush out anything that can cause continued irritation, swelling, and nasal blockage.  Following surgery, irrigation helps to wash out any blood clot or crusting and makes cleaning by the doctor in the clinic much more comfortable and pleasant. Irrigation is also one of the most effective ways to get topical medications directly to the nasal lining where they are needed.  How do I make the solution? You may purchase the NeilMed, or any other, over the counter sinus rinse system at your local pharmacy. Add 1 packet to the bottle and fill to the line with distilled, filtered, or boiled water at room temperature each time.  In some cases, your doctor may ask you to add certain medications to the bottle. Alternatively, you may use a bulb syringe, such as the kind used for infants, and follow the instructions below for making your own solution. You may make your own saline rinse solution by using boiled or distilled water and placing this in a clean 1 liter container. Add about 2-3 teaspoons of iodine free table salt (such as sea salt) and about 1 teaspoon of baking soda to the container. Let cool to room temperature before use. For  a smaller recipe, you can add 1/4 teaspoon of salt (no iodine) and 1/4 teaspoon of baking soda to 8 ounces of water at room temperature.    How do I irrigate my nose? Irrigation should be performed 1-2 times per day depending on your doctor's recommendations. Lean forward over a sink with your nose pointed slightly downwards and mouth open. Place the tip of the full irrigation bottle at the opening of the nostril and squeeze gently allowing the solution to enter the nose. You will feel the solution come out of the front of the nose and mouth and allow it to simply drain into the sink. Use half of the bottle for each nostril.   How do I clean the equipment? You should clean the bottle daily with soap and water to make sure you are not reintroducing bacteria into the nose. The bottles may be placed in the  as well.  Allow the bottle to cool and completely air dry before using again.  After a few months, you may consider purchasing a replacement bottle.  Helpful irrigation tips: If you are unable to tolerate the squeeze bottle, may substitute with [place]-Pot for a more gentle irrigation. It is okay to use Navage machine for irrigation if you prefer this method. Stop irrigating if you have to sneeze or cough.Do not inhale, speak, or swallow when irrigating as this could draw fluid into the middle ear or lung. At first you may find irrigation is uncomfortable. Try warming the fluid slightly or change the amount of squeezing pressure. After a while you should find irrigation to be quite pleasant. (Nasal Irrigation Video Tutorial Link:  https://www.sinusvideos.com/category/irrigations/)

## 2024-03-05 NOTE — PROGRESS NOTES
AUDIOLOGY ADULT AUDIOMETRIC EVALUATION      Name:  Toña Machuca  :  1985  Age:  38 y.o.  Date of Evaluation: 24    History:  Reason for visit:  Ms. Machuca was seen today as part of the visit with HARDIK Oliveira for an evaluation of hearing.   Chief Complaint   Patient presents with    Hearing Problem    Ear Fullness      Reported over two years ago she experienced a sudden hearing loss in the right ear, with recovery. Stated she has never really felt her right ear returned to her baseline and mentioned she continued to experience problems on the right side.   She has recently noticed some sound sensitivity in the right ear and stated she feels like she is hearing underwater. Mentioned when she has been listening to sounds, speech is slightly distorted. She recently wore an ear bud and noticed it was painful and uncomfortable in her ear and she was not able to hear well.   She has noticed some intermittent non-pulsatile non-bothersome ringing tinnitus at times in each ear.   Previous hearing testing performed on 22 indicated a mild sensorineural hearing loss in the right ear at 4,000 Hz.   Stated she experienced a possible infection last October and experienced Covid19 this past January.   Denied any current otalgia, dizziness/vertigo, ear surgery, recent falls, significant noise exposure, sinus/throat concerns, ear drainage, or sudden hearing loss.    EVALUATION     See Audiogram    RESULTS:    Otoscopic Evaluation:   Right Ear: Otoscopy revealed a clear healthy canal and a healthy tympanic membrane was visualized.   Left Ear:  Otoscopy revealed a clear healthy canal and a healthy tympanic membrane was visualized.     Immittance:  Immittance Measures: 226 Hz   Right Ear: Tympanometric testing revealed a normal type A tympanogram with normal middle ear pressure and normal static compliance.  Left Ear: Tympanometric testing revealed a normal type A tympanogram with normal  middle ear pressure and normal static compliance.    Ipsilateral acoustic reflexes were present at, 500-4,000 Hz, at expected sensation levels.  Ipsilateral acoustic reflexes were present at, 500-4,000 Hz, at expected sensation levels.    Test technique:  Pure Tone Audiometry via inserts     Reliability:   excellent    Pure Tone Audiometry:    Right Ear: Audiometric testing indicated normal peripheral hearing sensitivity from 125-2,000 Hz, sloping to a mild notched sensorineural hearing loss at 3,000 Hz, rising to normal hearing sensitivity above.  Left Ear:   Audiometric testing indicated normal peripheral hearing sensitivity from 125-8,000 Hz.      Speech Audiometry:   Right Ear:  Speech Reception Threshold (SRT) was obtained at 15 dBHL                 Word Recognition scores were excellent (100%) in quiet when words were presented at 55 dBHL  Left Ear:  Speech Reception Threshold (SRT) was obtained at 10 dBHL                 Word Recognition scores were excellent (100%) in quiet when words were presented at 50 dBHL  Testing was performed with recorded NU-6 speech words in quiet. Speech thresholds were in good agreement with the pure tone averages in each ear.     IMPRESSIONS:  Today's test results are consistent with normal peripheral hearing sensitivity, bilaterally.  The patient was counseled with regard to the findings.    When compared to the previous test results of 6/28/22 there has been no significant changes in hearing sensitivity. Note 6,000 Hz was previously not tested, but a comparison to 5/25/22 is essentially consistent.     RECOMMENDATIONS:  * Continue medical follow up with HARDIK Oliveira   * Retest as medically indicated, or sooner if a change in hearing sensitivity or tinnitus is noticed.   * Wear hearing protection while in the presence of loud sounds.   * Use tinnitus coping strategies as needed, such as sound apps on a smart phone, utilizing calming noise in the room, running a  fan at night, etc.   * Use effective communication strategies such as asking the speaker to gain attention prior to speaking, speaking in the same room, repeating words that were heard, etc.    PATIENT EDUCATION:   Discussed results and recommendations with the patient and a copy of the hearing test was provided.  Questions were addressed and the patient was encouraged to contact our department should concerns arise.  The patient was seen from

## 2024-03-05 NOTE — PROGRESS NOTES
Patient ID: Toña Machuca is a 38 y.o. female who presents for the evaluation of right hearing difficulty, bilateral non-pulsatile tinnitus, and nasal congestion.     PROVIDER IMPRESSIONS:  DIAGNOSES/PROBLEMS:  -Sensorineural hearing loss in right ear, unrestricted hearing in left ear  -Allergic rhinitis  -Cerumen impaction, left ear  -Tinnitus, bilateral     ASSESSMENT:   Toña Machuca is a pleasant 38 y.o. female who presents with symptoms of right hearing difficulty, bilateral non-pulsatile tinnitus, and nasal congestion. Based on the clinical information provided, symptoms and clinical exam findings are consistent with allergic rhinitis, left cerumen impaction, and ongoing right sensorineural hearing loss that has not progressed since sudden sensorineural hearing loss incident on May 2022. Using appropriate instrumentation, impacted cerumen successfully removed from the left EAC. Reassurance provided to patient that otologic exam today revealed no evidence of acute infection/inflammation in the external auditory canal (EAC) bilaterally and that tympanic membrane (TM) appears with no evidence of infection, effusion, retraction or perforation bilaterally.  Audiogram reviewed in detail with the patient, which revealed mild notched sensorineural hearing loss in the right ear that has remained essentially stable since prior audiogram in June 2022. Anterior rhinoscopy exam today revealed bilateral inferior nasal turbinate hypertrophy and bluish-pale color to the nasal mucosa with no evidence of intranasal masses, foreign bodies, bleeding or purulence suggestive of acute infection.     PLAN:  I recommend the patient starts to perform intranasal saline irrigations, either once daily or every other day for allergic rhinitis. I educated the patient that saline irrigations aid in flushing out residual mucus, crusts, allergens or other environmental irritants in the nasal cavity. I emphasized that this will help  clean the nasal cavity PRIOR to use of the medicated intranasal spray. I counseled the patient on appropriate administration of saline irrigations. I informed the patient that saline irrigation kits may be purchased over the counter.   I recommended changing to daily/consistent use of  triamcinolone (Nasocort) instead of Flonase nasal spray. I recommended either 2 puffs into each nostril daily, or 1 puff into each nostril twice daily for a consistent trial of at least 4-6 weeks. Patient counseled that this medication is a topical intranasal steroid nasal spray indicated to reduce nasal inflammation. Patient was educated on proper administration of nasal spray, by tilting their head down and pointing the applicator tip towards the lateral wall of the nose/corner of the eye on the same side. I advised patient to avoid pointing applicator toward the septum due to the risk of nasal bleeding.  Patient informed to discontinue the spray if they experience adverse side effects. This medication may be purchased over the counter; a prescription may be sent to the patient's pharmacy upon request.  I recommended referral to allergy/immunology to determine indication for allergy testing to identify triggers and properly direct avoidance of triggers and symptom management of allergies.   Patient advised to wear ear hearing protection while in the presence of loud sounds. The patient was also counseled to utilize use tinnitus coping strategies as needed, such as sound apps on a smartphone, utilizing calming noise in the room, running a fan at night, etc.    Follow-up: Patient may schedule for follow-up in 6-8 weeks to evaluate treatment outcomes for sinonasal symptoms, may likely perform nasal endoscopy exam at follow-up for ongoing/refractory symptoms. She may follow up in 1 year with audiogram as routine hearing surveillance. They may follow-up sooner, if needed. Patient is agreeable to this plan, all questions were answered to  patient's satisfaction.     Subjective   HPI: Toña Machuca is a 38 y.o. female who presents for the evaluation of bilateral tinnitus and right hearing difficulty. Reports a history sudden hearing loss in the right ear in May 2022, with recovery after receiving p.o. steroids and IT dexamethasone injection with Dr. Nini Cast at that time. Stated she has never really felt her right ear returned to her baseline and mentioned some sound sensitivity in the right ear when wearing headphones. Mentioned when she has been listening to sounds, speech is slightly distorted and describes sensation as if she is hearing underwater/muffled intermittently. She has noticed some intermittent non-pulsatile non-bothersome ringing tinnitus bilaterally which began approximately 5 months ago. States that she has had persistent nasal congestion for the past 5 months as well. She used flonase for nasal symptoms for the past 5 months but recently stopped using as she feels it was no longer helping. Mentions she has previously used azelastine nasal spray for nasal symptoms but states it makes her nose burn. She has taken p.o. zyrtec daily for the past 5 months which she states does improve sinonasal symptoms. Denies the presence of ear pain, ear fullness/pressure, ear itching, ear drainage, autophony, dizziness or vertigo.When asked about pertinent otologic history, the patient denies a history of recurrent ear infections, denies history of ear surgery, denies history of PE tube insertion, and denies history of prolonged/traumatic loud noise exposure. The patient does not insert Q-tips in the ear canals, and  denies insertion of other foreign objects into the ear canals. The patient denies history of wearing hearing aid devices.   The patient does not endorse a family history of hearing loss.     PATIENT HISTORY:  Past Medical History:   Diagnosis Date    Acute maxillary sinusitis, unspecified 01/26/2018    Acute maxillary sinusitis     Acute upper respiratory infection, unspecified 2019    Viral upper respiratory tract infection    Acute upper respiratory infection, unspecified 2019    Viral URI with cough    Acute vaginitis 2017    Bacterial vaginosis    Body mass index (BMI) 45.0-49.9, adult (CMS/[facility]) 02/15/2022    Body mass index (BMI) of 45.0 to 49.9 in adult    Candidiasis, unspecified     Yeast infection    Contact with and (suspected) exposure to covid-19 2020    Close exposure to COVID-19 virus    Encounter for immunization 2017    Influenza vaccination administered at current visit    History of uterine scar from previous surgery 2014    Status post     Influenza due to other identified influenza virus with other respiratory manifestations 2018    Influenza A    Irregular menstruation, unspecified 2014    Missed period    Localized enlarged lymph nodes 2018    Lymphadenopathy, cervical    Morbid (severe) obesity due to excess calories (CMS/[facility]) 10/06/2021    Obesity, morbid, BMI 40.0-49.9    Other conditions influencing health status 2014    History of pregnancy    Other sites of candidiasis 2014    Candida rash of groin    Other specified soft tissue disorders 2017    Left leg swelling    Pain in left leg 2017    Pain in central left lower extremity    Pain in throat 2018    Chronic throat pain    Pelvic and perineal pain 2016    Pelvic pain in female    Personal history of diseases of the blood and blood-forming organs and certain disorders involving the immune mechanism 2017    History of leukocytosis    Personal history of other benign neoplasm     History of uterine fibroid    Personal history of other complications of pregnancy, childbirth and the puerperium 2020    History of pregnancy induced hypertension    Personal history of other diseases of the circulatory system     History of hypertension    Personal history of  other diseases of the digestive system 03/03/2017    History of acute gastritis    Personal history of other diseases of the nervous system and sense organs     History of sleep apnea    Personal history of other diseases of the respiratory system 01/05/2022    History of sore throat    Personal history of other diseases of the respiratory system     History of asthma    Personal history of other diseases of the respiratory system 05/08/2022    History of acute sinusitis    Personal history of other diseases of the respiratory system 04/03/2019    History of acute pharyngitis    Personal history of other diseases of the respiratory system 11/11/2018    History of streptococcal pharyngitis    Personal history of other diseases of the respiratory system 02/23/2017    History of acute bronchitis    Personal history of other diseases of the respiratory system     Personal history of asthma    Personal history of other diseases of the respiratory system 03/02/2020    History of acute pharyngitis    Personal history of other infectious and parasitic diseases 12/16/2019    History of viral infection    Personal history of other specified conditions 02/06/2018    History of fever    Personal history of urinary (tract) infections 10/14/2016    History of urinary tract infection    Strain of other muscle(s) and tendon(s) at lower leg level, unspecified leg, initial encounter 12/27/2017    Strain of tibialis anterior muscle, initial encounter    Streptococcus, group A, as the cause of diseases classified elsewhere 02/07/2018    Group A streptococcal infection    Unspecified condition associated with female genital organs and menstrual cycle 04/16/2014    Genital symptoms, female    Unspecified disorder of synovium and tendon, right shoulder 07/20/2017    Disorder of right rotator cuff    Unspecified disorder of synovium and tendon, right shoulder 08/02/2017    Tendinopathy of rotator cuff, right    Unspecified maternal  "hypertension, unspecified trimester 2014    Hypertension in pregnancy, antepartum      Past Surgical History:   Procedure Laterality Date     SECTION, CLASSIC  2018     Section    MR HEAD ANGIO WO IV CONTRAST  2022    MR HEAD ANGIO WO IV CONTRAST 2022 [facility]    MR NECK ANGIO WO IV CONTRAST  2022    MR NECK ANGIO WO IV CONTRAST 2022 [facility]    OTHER SURGICAL HISTORY  2021    Uterine myomectomy    OTHER SURGICAL HISTORY  2022    Hemorrhoid rubber band ligation    PILONIDAL CYST DRAINAGE  2014    Pilonidal Cyst Resection      Allergies   Allergen Reactions    Shellfish Containing Products Unknown, Shortness of breath and Swelling    Sulfa (Sulfonamide Antibiotics) Anaphylaxis, Shortness of breath and Swelling    Bee Venom Protein (Honey Bee) Unknown    Latex Unknown    Other Other and Unknown    Penicillins Swelling     \"swelling\"    Prednisone Other        Current Outpatient Medications:     albuterol 90 mcg/actuation inhaler, Inhale 1 puff every 4 hours if needed., Disp: , Rfl:     amLODIPine (Norvasc) 5 mg tablet, Take 1 tablet (5 mg) by mouth once daily., Disp: 30 tablet, Rfl: 11    azelastine (Astelin) 137 mcg (0.1 %) nasal spray, ADMINISTER 2 SPRAYS INTO EACH NOSTRIL ONCE DAILY., Disp: 30 mL, Rfl: 1    clindamycin-benzoyl peroxide (Duac) 1.2-5% gel, Clindamycin Phos-Benzoyl Perox 1.2-5 % External Gel Quantity: 45  Refills: 0  Start: 2022, Disp: , Rfl:     EPINEPHrine (Epipen) 0.3 mg/0.3 mL injection syringe, Inject 0.3 mL (0.3 mg) into the muscle if needed for anaphylaxis for up to 1 day. Call 911 after use., Disp: 1 each, Rfl: 1    ergocalciferol (Vitamin D-2) 1.25 MG (89233 UT) capsule, Take 1 capsule (1,250 mcg) by mouth 2 times a week., Disp: 24 capsule, Rfl: 3    Flonase Sensimist 27.5 mcg/actuation nasal spray, Administer 2 sprays into each nostril once daily., Disp: , Rfl:     levonorgestrel (Mirena) 21 mcg/24 " hours (8 yrs) 52 mg IUD, USE AS DIRECTED, Disp: , Rfl:     losartan (Cozaar) 100 mg tablet, Take 1 tablet (100 mg) by mouth once daily., Disp: 30 tablet, Rfl: 11    pantoprazole (ProtoNix) 40 mg EC tablet, Take 1 tablet (40 mg) by mouth once daily. Do not crush, chew, or split., Disp: 30 tablet, Rfl: 5    phentermine (Adipex-P) 37.5 mg tablet, Take 1 tablet (37.5 mg) by mouth once daily in the morning. Take before meals., Disp: 30 tablet, Rfl: 0   Tobacco Use: Low Risk  (2/24/2024)    Patient History     Smoking Tobacco Use: Never     Smokeless Tobacco Use: Never     Passive Exposure: Not on file      Alcohol Use: Not on file      Social History     Substance and Sexual Activity   Drug Use Not Currently        Review of Systems   All other systems negative.     Objective   Visit Vitals  OB Status Having periods   Smoking Status Never        PHYSICAL EXAM:  General appearance: Appears well, well-nourished, well groomed. No acute distress.   Constitutional: No fever, chills, weight loss or weight gain.  Communication: Normal communication  Psychiatric: Oriented to person, place and time. Normal mood and affect.  Neurologic: Cranial nerves II-XII grossly intact and symmetric bilaterally.  Cardiovascular: Examination of peripheral vascular system shows no clubbing or cyanosis.  Respiratory: No respiratory distress increased work of breathing. Inspection of the chest with symmetric chest expansion and normal respiratory effort.  Skin: No head and neck rashes.  Head: Normocephalic. Atraumatic with no masses, lesions or scarring.  Face: Normal symmetry. No scars or deformities.  Eyes: Conjunctiva not edematous or erythematous. PERRLA  Neck: Supple and symmetric, trachea midline. Lymph nodes with no adenopathy.  Head: Normocephalic. Atraumatic with no masses, lesions or scarring.  Eyes: PERRL, EOMI, Conjunctiva is clear. No nystagmus.  Nose: External inspection of nose: No nasal lesions, lacerations or scars. No tenderness  on frontal or maxillary sinus palpation. Anterior rhinoscopy with limited visualization past the inferior turbinates: Septum is midline.  No septal perforation or lesions. No septal hematoma/ seroma.  No signs of bleeding.  No evidence of intranasal polyps.  Inferior turbinates are pale/boggy and hypertrophied.    Throat:  Floor of mouth is clear, no masses.  Tongue appears normal, no lesions or masses. Gums, gingiva, buccal mucosa appear pink and moist, no lesions. Teeth are in intact.  No obvious dental infections.  Peritonsillar regions appear symmetric without swelling.  Hard and soft palate appear normal, no obvious cleft. Uvula is midline.  Left Tonsil -- 2+, no exudates.  Right Tonsil -- 2+, no exudates.  Oropharynx: No lesions. Retropharyngeal wall is flat.  No postnasal drip.  Salivary Glands: Symmetric bilaterally.  No palpable masses.  No evidence of acute infection or salivary stones.  TMJ: Normal, no trismus.  Right Ear: External inspection of ear with no deformity, scars, or masses. Mastoid is nontender. External auditory canal is clear. TM is intact with no sign of infection, effusion, or retraction.  No perforation seen. Auto insufflation visible under microscopy.  Left Ear: External inspection of ear with no deformity, scars, or masses. Mastoid is nontender. External auditory canal is partially impacted with cerumen. Unable to visualize TM.     RESULTS:  I personally reviewed the patient's audiogram today from 3/5/24, which showed: Right ear with normal hearing through 2000 Hz, sloping to mild notched sensorineural hearing loss at 3,000 Hz, rising to normal hearing sensitivity above. Left ear with normal hearing across all frequencies. Normal tympanogram bilaterally. Excellent word discrimination bilaterally. Acoustic reflexes present right ipsilateral, and left ipsilateral.   When compared to the previous test results of 6/28/22 there has been no significant changes in hearing sensitivity. Note  6,000 Hz was previously not tested, but a comparison to 5/25/22 is essentially consistent.      I personally reviewed the patient's MRI IAC w/wo contrast from 6/16/22 which showed no focal lesions at the VII or VIII cranial nerve complex.     Procedures   EAR CLEANING PROCEDURE NOTE:  Indication: Cerumen impaction  Location: left ear canals  Procedure Note: The procedure was performed by the provider.  Visualization Instrument: A microscope with a #5 speculum was placed in the ear canals to visualize the ear canal debris.  Ear Cleaning Instrument and Outcome: Using the alligator forceps, a large amount of firm, brown cerumen was removed from the impacted EAC(s).  Post-Procedure/Microscopic Otologic Exam:  Right Ear--TM is intact with no sign of infection, effusion, or retraction.  No perforation seen. EAC is clear. Auto insufflation visible under microscopy.  Left Ear-- TM is intact with no sign of infection, effusion, or retraction.  No perforation seen. EAC is clear. Auto insufflation visible under microscopy.  Patient Status: The patient tolerated the procedure well.  Complications: There were no complications.     Chetna Mariee, APRN-CNP

## 2024-03-08 ENCOUNTER — OFFICE VISIT (OUTPATIENT)
Dept: OPHTHALMOLOGY | Facility: CLINIC | Age: 39
End: 2024-03-08
Payer: COMMERCIAL

## 2024-03-08 DIAGNOSIS — Q12.0 CONGENITAL CATARACT OF BOTH EYES: ICD-10-CM

## 2024-03-08 DIAGNOSIS — H52.13 MYOPIA OF BOTH EYES: Primary | ICD-10-CM

## 2024-03-08 PROCEDURE — 92015 DETERMINE REFRACTIVE STATE: CPT | Performed by: OPTOMETRIST

## 2024-03-08 PROCEDURE — 92004 COMPRE OPH EXAM NEW PT 1/>: CPT | Performed by: OPTOMETRIST

## 2024-03-08 ASSESSMENT — ENCOUNTER SYMPTOMS
CARDIOVASCULAR NEGATIVE: 0
HEMATOLOGIC/LYMPHATIC NEGATIVE: 0
EYES NEGATIVE: 0
ALLERGIC/IMMUNOLOGIC NEGATIVE: 0
MUSCULOSKELETAL NEGATIVE: 0
NEUROLOGICAL NEGATIVE: 0
GASTROINTESTINAL NEGATIVE: 0
CONSTITUTIONAL NEGATIVE: 0
RESPIRATORY NEGATIVE: 0
PSYCHIATRIC NEGATIVE: 0
ENDOCRINE NEGATIVE: 0

## 2024-03-08 ASSESSMENT — SLIT LAMP EXAM - LIDS
COMMENTS: NORMAL
COMMENTS: NORMAL

## 2024-03-08 ASSESSMENT — VISUAL ACUITY
OD_SC: 20/20
OD_SC: J1+
OS_SC: 20/20
METHOD: SNELLEN - LINEAR
OS_SC: J1+

## 2024-03-08 ASSESSMENT — CONF VISUAL FIELD
OS_SUPERIOR_TEMPORAL_RESTRICTION: 0
OS_SUPERIOR_NASAL_RESTRICTION: 0
OS_NORMAL: 1
OD_SUPERIOR_NASAL_RESTRICTION: 0
OD_INFERIOR_NASAL_RESTRICTION: 0
OD_SUPERIOR_TEMPORAL_RESTRICTION: 0
OD_NORMAL: 1
OS_INFERIOR_NASAL_RESTRICTION: 0
OD_INFERIOR_TEMPORAL_RESTRICTION: 0
OS_INFERIOR_TEMPORAL_RESTRICTION: 0

## 2024-03-08 ASSESSMENT — CUP TO DISC RATIO
OD_RATIO: 0.2
OS_RATIO: 0.2

## 2024-03-08 ASSESSMENT — EXTERNAL EXAM - LEFT EYE: OS_EXAM: NORMAL

## 2024-03-08 ASSESSMENT — TONOMETRY
OD_IOP_MMHG: 14
IOP_METHOD: GOLDMANN APPLANATION
OS_IOP_MMHG: 14

## 2024-03-08 ASSESSMENT — REFRACTION_MANIFEST
OS_SPHERE: -0.25
OD_SPHERE: -0.25

## 2024-03-08 ASSESSMENT — EXTERNAL EXAM - RIGHT EYE: OD_EXAM: NORMAL

## 2024-03-08 NOTE — PROGRESS NOTES
Mild congenital cortical cataracts OU.  Minimal myopia and recommend to defer glasses wear. The patient was asked to return to our clinic in one year or sooner if ocular or vision changes occur.

## 2024-04-23 ENCOUNTER — OFFICE VISIT (OUTPATIENT)
Dept: OTOLARYNGOLOGY | Facility: CLINIC | Age: 39
End: 2024-04-23
Payer: COMMERCIAL

## 2024-04-23 DIAGNOSIS — R09.81 NASAL CONGESTION: ICD-10-CM

## 2024-04-23 DIAGNOSIS — H57.9 PRURITUS OF BOTH EYES: ICD-10-CM

## 2024-04-23 DIAGNOSIS — J30.9 ALLERGIC RHINITIS, UNSPECIFIED SEASONALITY, UNSPECIFIED TRIGGER: Primary | ICD-10-CM

## 2024-04-23 ASSESSMENT — PATIENT HEALTH QUESTIONNAIRE - PHQ9
1. LITTLE INTEREST OR PLEASURE IN DOING THINGS: NOT AT ALL
2. FEELING DOWN, DEPRESSED OR HOPELESS: NOT AT ALL
SUM OF ALL RESPONSES TO PHQ9 QUESTIONS 1 AND 2: 0

## 2024-04-23 NOTE — PROGRESS NOTES
Patient ID: Toña Machuca is a 38 y.o. female who presents for subsequent evaluation of sinus/nose problems.     PROVIDER IMPRESSIONS:  DIAGNOSES/PROBLEMS:  -Allergic rhinitis  -Nasal congestion  -Pruritus/epiphora of both eyes    ASSESSMENT:   Toña Machuca is a pleasant 38 y.o. female who presents for subsequent evaluation of nasal congestion. Since last visit, symptoms are improved with use of Nasacort nasal spray and initiation of nasal saline irrigations. Exam findings today revealed reduced inferior turbinate hypertrophy bilaterally with resolution of pale/boggy appearance of the nasal turbinates previously seen on exam. Based on the clinical information provided, symptoms and clinical exam findings are consistent with resolving allergic rhinitis. Patient offered reassurance and counseling regarding exam findings today. I explained that given associated symptoms of epiphora and eye pruritus with outdoor environmental exposure that it would be reasonable to her to continue to pursue allergy/immunology testing to identify specific allergen triggers.     PLAN:  I recommended patient continues with daily, more consistent performance of nasal saline irrigations as previously recommended.   I recommended patient continues with daily/consistent use of Nasacort nasal spray as previously recommended. Prescription refill declined.   I recommended patient establishes care with allergy/immunology specialist at upcoming visit in June 2024 with Dr. Princess Rios. I explained that recommendations for oral antihistamine therapy should be further determined with allergy specialist.   Follow-up: Patient may schedule for follow-up as needed. Patient is agreeable to this plan, all questions were answered to patient's satisfaction.     Subjective   HPI: Toña Machuca is a 38 y.o. female who presents for 7 week follow-up to evaluate treatment outcomes for symptoms of allergic rhinitis. Since last visit on 3/5/24,  there has been significant improvement in symptoms of nasal congestion when she tends to be more consistent with topical nasal regimen recommendations. In review, prior recommendations included starting nasal saline irrigations and switching to Nasacort nasal spray, instead of Flonase, for allergic rhinitis. They report ongoing symptoms of eye pruritus/epiphora which have not worsened since the prior visit. Patient states that she typically performs nasal saline irrigations and administers Nasacort nasal spray approximately 2 times weekly instead of daily recommended use. States she notices the most benefit with performing nasal saline irrigations. Mentions that nasal congestion symptoms are most noticeable when she lays in bed at night and believes this may be associated with an allergy to dust mites. Symptoms of eye pruritus/epiphora are most noticeable when she walks outside. She denies current use of p.o. antihistamine o.t.c. therapy. Denies the presence of new symptoms including the presence of rhinorrhea, facial pain, facial pressure, nasal obstruction, loss of smell, or loss of taste. Patient denies epistaxis. Mentions that she has an upcoming visit with allergy/immunology specialist Dr. Rios for allergy/immunology testing in June 2024.     RECALL 3/5/24:  HPI: Toña Machuca is a 38 y.o. female who presents for the evaluation of bilateral tinnitus and right hearing difficulty. Reports a history sudden hearing loss in the right ear in May 2022, with recovery after receiving p.o. steroids and IT dexamethasone injection with Dr. Nini Cast at that time. Stated she has never really felt her right ear returned to her baseline and mentioned some sound sensitivity in the right ear when wearing headphones. Mentioned when she has been listening to sounds, speech is slightly distorted and describes sensation as if she is hearing underwater/muffled intermittently. She has noticed some intermittent  non-pulsatile non-bothersome ringing tinnitus bilaterally which began approximately 5 months ago. States that she has had persistent nasal congestion for the past 5 months as well. She used flonase for nasal symptoms for the past 5 months but recently stopped using as she feels it was no longer helping. Mentions she has previously used azelastine nasal spray for nasal symptoms but states it makes her nose burn. She has taken p.o. zyrtec daily for the past 5 months which she states does improve sinonasal symptoms. Denies the presence of ear pain, ear fullness/pressure, ear itching, ear drainage, autophony, dizziness or vertigo. When asked about pertinent otologic history, the patient denies a history of recurrent ear infections, denies history of ear surgery, denies history of PE tube insertion, and denies history of prolonged/traumatic loud noise exposure. The patient does not insert Q-tips in the ear canals, and  denies insertion of other foreign objects into the ear canals. The patient denies history of wearing hearing aid devices. The patient does not endorse a family history of hearing loss.   ASSESSMENT: Toña Machuca is a pleasant 38 y.o. female who presents with symptoms of right hearing difficulty, bilateral non-pulsatile tinnitus, and nasal congestion. Based on the clinical information provided, symptoms and clinical exam findings are consistent with allergic rhinitis, left cerumen impaction, and ongoing right sensorineural hearing loss that has not progressed since sudden sensorineural hearing loss incident on May 2022. Using appropriate instrumentation, impacted cerumen successfully removed from the left EAC. Reassurance provided to patient that otologic exam today revealed no evidence of acute infection/inflammation in the external auditory canal (EAC) bilaterally and that tympanic membrane (TM) appears with no evidence of infection, effusion, retraction or perforation bilaterally. Audiogram  reviewed in detail with the patient, which revealed mild notched sensorineural hearing loss in the right ear that has remained essentially stable since prior audiogram in June 2022. Anterior rhinoscopy exam today revealed bilateral inferior nasal turbinate hypertrophy and bluish-pale color to the nasal mucosa with no evidence of intranasal masses, foreign bodies, bleeding or purulence suggestive of acute infection.   PLAN:  I recommend the patient starts to perform intranasal saline irrigations, either once daily or every other day for allergic rhinitis. I educated the patient that saline irrigations aid in flushing out residual mucus, crusts, allergens or other environmental irritants in the nasal cavity. I emphasized that this will help clean the nasal cavity PRIOR to use of the medicated intranasal spray. I counseled the patient on appropriate administration of saline irrigations. I informed the patient that saline irrigation kits may be purchased over the counter.   I recommended changing to daily/consistent use of  triamcinolone (Nasocort) instead of Flonase nasal spray. I recommended either 2 puffs into each nostril daily, or 1 puff into each nostril twice daily for a consistent trial of at least 4-6 weeks. Patient counseled that this medication is a topical intranasal steroid nasal spray indicated to reduce nasal inflammation. Patient was educated on proper administration of nasal spray, by tilting their head down and pointing the applicator tip towards the lateral wall of the nose/corner of the eye on the same side. I advised patient to avoid pointing applicator toward the septum due to the risk of nasal bleeding.  Patient informed to discontinue the spray if they experience adverse side effects. This medication may be purchased over the counter; a prescription may be sent to the patient's pharmacy upon request.  I recommended referral to allergy/immunology to determine indication for allergy testing to  identify triggers and properly direct avoidance of triggers and symptom management of allergies.   Patient advised to wear ear hearing protection while in the presence of loud sounds. The patient was also counseled to utilize use tinnitus coping strategies as needed, such as sound apps on a smartphone, utilizing calming noise in the room, running a fan at night, etc.    Follow-up: Patient may schedule for follow-up in 6-8 weeks to evaluate treatment outcomes for sinonasal symptoms, may likely perform nasal endoscopy exam at follow-up for ongoing/refractory symptoms. She may follow up in 1 year with audiogram as routine hearing surveillance. They may follow-up sooner, if needed. Patient is agreeable to this plan, all questions were answered to patient's satisfaction.     PATIENT HISTORY:  Past Medical History:   Diagnosis Date    Acute maxillary sinusitis, unspecified 2018    Acute maxillary sinusitis    Acute upper respiratory infection, unspecified 2019    Viral upper respiratory tract infection    Acute upper respiratory infection, unspecified 2019    Viral URI with cough    Acute vaginitis 2017    Bacterial vaginosis    Body mass index (BMI) 45.0-49.9, adult (Multi) 02/15/2022    Body mass index (BMI) of 45.0 to 49.9 in adult    Candidiasis, unspecified     Yeast infection    Contact with and (suspected) exposure to covid-19 2020    Close exposure to COVID-19 virus    Encounter for immunization 2017    Influenza vaccination administered at current visit    History of uterine scar from previous surgery 2014    Status post     Influenza due to other identified influenza virus with other respiratory manifestations 2018    Influenza A    Irregular menstruation, unspecified 2014    Missed period    Localized enlarged lymph nodes 2018    Lymphadenopathy, cervical    Morbid (severe) obesity due to excess calories (Multi) 10/06/2021    Obesity, morbid,  BMI 40.0-49.9    Other conditions influencing health status 08/14/2014    History of pregnancy    Other sites of candidiasis 07/31/2014    Candida rash of groin    Other specified soft tissue disorders 12/27/2017    Left leg swelling    Pain in left leg 12/27/2017    Pain in central left lower extremity    Pain in throat 08/17/2018    Chronic throat pain    Pelvic and perineal pain 06/29/2016    Pelvic pain in female    Personal history of diseases of the blood and blood-forming organs and certain disorders involving the immune mechanism 11/14/2017    History of leukocytosis    Personal history of other benign neoplasm     History of uterine fibroid    Personal history of other complications of pregnancy, childbirth and the puerperium 04/03/2020    History of pregnancy induced hypertension    Personal history of other diseases of the circulatory system     History of hypertension    Personal history of other diseases of the digestive system 03/03/2017    History of acute gastritis    Personal history of other diseases of the nervous system and sense organs     History of sleep apnea    Personal history of other diseases of the respiratory system 01/05/2022    History of sore throat    Personal history of other diseases of the respiratory system     History of asthma    Personal history of other diseases of the respiratory system 05/08/2022    History of acute sinusitis    Personal history of other diseases of the respiratory system 04/03/2019    History of acute pharyngitis    Personal history of other diseases of the respiratory system 11/11/2018    History of streptococcal pharyngitis    Personal history of other diseases of the respiratory system 02/23/2017    History of acute bronchitis    Personal history of other diseases of the respiratory system     Personal history of asthma    Personal history of other diseases of the respiratory system 03/02/2020    History of acute pharyngitis    Personal history of  "other infectious and parasitic diseases 2019    History of viral infection    Personal history of other specified conditions 2018    History of fever    Personal history of urinary (tract) infections 10/14/2016    History of urinary tract infection    Strain of other muscle(s) and tendon(s) at lower leg level, unspecified leg, initial encounter 2017    Strain of tibialis anterior muscle, initial encounter    Streptococcus, group A, as the cause of diseases classified elsewhere 2018    Group A streptococcal infection    Unspecified condition associated with female genital organs and menstrual cycle 2014    Genital symptoms, female    Unspecified disorder of synovium and tendon, right shoulder 2017    Disorder of right rotator cuff    Unspecified disorder of synovium and tendon, right shoulder 2017    Tendinopathy of rotator cuff, right    Unspecified maternal hypertension, unspecified trimester ([facility]) 2014    Hypertension in pregnancy, antepartum      Past Surgical History:   Procedure Laterality Date     SECTION, CLASSIC  2018     Section    MR HEAD ANGIO WO IV CONTRAST  2022    MR HEAD ANGIO WO IV CONTRAST 2022 [facility]    MR NECK ANGIO WO IV CONTRAST  2022    MR NECK ANGIO WO IV CONTRAST 2022 [facility]    OTHER SURGICAL HISTORY  2021    Uterine myomectomy    OTHER SURGICAL HISTORY  2022    Hemorrhoid rubber band ligation    PILONIDAL CYST DRAINAGE  2014    Pilonidal Cyst Resection      Allergies   Allergen Reactions    Shellfish Containing Products Unknown, Shortness of breath and Swelling    Sulfa (Sulfonamide Antibiotics) Anaphylaxis, Shortness of breath and Swelling    Bee Venom Protein (Honey Bee) Unknown    Latex Unknown    Other Other and Unknown    Penicillins Swelling     \"swelling\"    Prednisone Other        Current Outpatient Medications:     albuterol 90 mcg/actuation " inhaler, Inhale 1 puff every 4 hours if needed., Disp: , Rfl:     amLODIPine (Norvasc) 5 mg tablet, Take 1 tablet (5 mg) by mouth once daily., Disp: 30 tablet, Rfl: 11    clindamycin-benzoyl peroxide (Duac) 1.2-5% gel, Clindamycin Phos-Benzoyl Perox 1.2-5 % External Gel Quantity: 45  Refills: 0  Start: 22-Jun-2022, Disp: , Rfl:     EPINEPHrine (Epipen) 0.3 mg/0.3 mL injection syringe, Inject 0.3 mL (0.3 mg) into the muscle if needed for anaphylaxis for up to 1 day. Call 911 after use., Disp: 1 each, Rfl: 1    ergocalciferol (Vitamin D-2) 1.25 MG (64159 UT) capsule, Take 1 capsule (1,250 mcg) by mouth 2 times a week., Disp: 24 capsule, Rfl: 3    Flonase Sensimist 27.5 mcg/actuation nasal spray, Administer 2 sprays into each nostril once daily., Disp: , Rfl:     levonorgestrel (Mirena) 21 mcg/24 hours (8 yrs) 52 mg IUD, USE AS DIRECTED, Disp: , Rfl:     losartan (Cozaar) 100 mg tablet, Take 1 tablet (100 mg) by mouth once daily., Disp: 30 tablet, Rfl: 11    pantoprazole (ProtoNix) 40 mg EC tablet, Take 1 tablet (40 mg) by mouth once daily. Do not crush, chew, or split., Disp: 30 tablet, Rfl: 5    phentermine (Adipex-P) 37.5 mg tablet, Take 1 tablet (37.5 mg) by mouth once daily in the morning. Take before meals., Disp: 30 tablet, Rfl: 0    triamcinolone (Nasacort) 55 mcg nasal inhaler, Administer 1 spray into each nostril 2 times a day., Disp: 16.5 g, Rfl: 11   Tobacco Use: Unknown (4/16/2024)    Received from [facility]    Patient History     Smoking Tobacco Use: Never     Smokeless Tobacco Use: Unknown     Passive Exposure: Not on file      Alcohol Use: Not on file      Social History     Substance and Sexual Activity   Drug Use Not Currently        Review of Systems   All other systems negative.     Objective   ENT FOCUSED PHYSICAL EXAM:   Right Ear: External inspection of ear with no deformity, scars, or masses. Mastoid is nontender. External auditory canal is clear. TM is intact with no sign of infection,  effusion, or retraction.  No perforation seen. Auto insufflation visible under microscopy.   Left Ear: External inspection of ear with no deformity, scars, or masses. Mastoid is nontender. External auditory canal is clear. TM is intact with no sign of infection, effusion, or retraction.  No perforation seen. Auto insufflation visible under microscopy.   Nose: External inspection of nose: No nasal lesions, lacerations or scars. No tenderness on frontal or maxillary sinus palpation. Anterior rhinoscopy with limited visualization past the inferior turbinates: Septum is midline.  No septal perforation or lesions. No septal hematoma/ seroma.  No signs of bleeding.  No evidence of intranasal polyps.  Inferior turbinates are hypertrophied.    Throat:  Floor of mouth is clear, no masses.  Tongue appears normal, no lesions or masses. Gums, gingiva, buccal mucosa appear pink and moist, no lesions. Teeth are in intact.  No obvious dental infections.  Peritonsillar regions appear symmetric without swelling.  Hard and soft palate appear normal, no obvious cleft. Uvula is midline.  Left Tonsil -- 2+, no exudates.  Right Tonsil -- 2+, no exudates.      Chetna Mariee, APRN-CNP

## 2024-04-29 DIAGNOSIS — K21.9 GASTROESOPHAGEAL REFLUX DISEASE WITHOUT ESOPHAGITIS: ICD-10-CM

## 2024-04-29 RX ORDER — PANTOPRAZOLE SODIUM 40 MG/1
40 TABLET, DELAYED RELEASE ORAL DAILY
Qty: 90 TABLET | Refills: 3 | Status: SHIPPED | OUTPATIENT
Start: 2024-04-29

## 2024-05-13 ENCOUNTER — OFFICE VISIT (OUTPATIENT)
Dept: PRIMARY CARE | Facility: CLINIC | Age: 39
End: 2024-05-13
Payer: COMMERCIAL

## 2024-05-13 VITALS
TEMPERATURE: 97.7 F | DIASTOLIC BLOOD PRESSURE: 80 MMHG | RESPIRATION RATE: 16 BRPM | SYSTOLIC BLOOD PRESSURE: 132 MMHG | WEIGHT: 240 LBS | OXYGEN SATURATION: 98 % | HEIGHT: 61 IN | HEART RATE: 78 BPM | BODY MASS INDEX: 45.31 KG/M2

## 2024-05-13 DIAGNOSIS — M54.50 ACUTE RIGHT-SIDED LOW BACK PAIN WITHOUT SCIATICA: Primary | ICD-10-CM

## 2024-05-13 DIAGNOSIS — M62.838 MUSCLE SPASM: ICD-10-CM

## 2024-05-13 DIAGNOSIS — E66.01 CLASS 3 SEVERE OBESITY WITH SERIOUS COMORBIDITY AND BODY MASS INDEX (BMI) OF 45.0 TO 49.9 IN ADULT, UNSPECIFIED OBESITY TYPE (MULTI): ICD-10-CM

## 2024-05-13 PROCEDURE — 99212 OFFICE O/P EST SF 10 MIN: CPT | Performed by: NURSE PRACTITIONER

## 2024-05-13 RX ORDER — DICLOFENAC SODIUM 75 MG/1
75 TABLET, DELAYED RELEASE ORAL 2 TIMES DAILY PRN
Qty: 60 TABLET | Refills: 0 | Status: SHIPPED | OUTPATIENT
Start: 2024-05-13 | End: 2024-07-12

## 2024-05-13 RX ORDER — METHOCARBAMOL 500 MG/1
500 TABLET, FILM COATED ORAL 4 TIMES DAILY PRN
Qty: 40 TABLET | Refills: 0 | Status: SHIPPED | OUTPATIENT
Start: 2024-05-13 | End: 2024-07-12

## 2024-05-13 ASSESSMENT — PATIENT HEALTH QUESTIONNAIRE - PHQ9
2. FEELING DOWN, DEPRESSED OR HOPELESS: NOT AT ALL
1. LITTLE INTEREST OR PLEASURE IN DOING THINGS: NOT AT ALL
SUM OF ALL RESPONSES TO PHQ9 QUESTIONS 1 AND 2: 0
1. LITTLE INTEREST OR PLEASURE IN DOING THINGS: NOT AT ALL
2. FEELING DOWN, DEPRESSED OR HOPELESS: NOT AT ALL
SUM OF ALL RESPONSES TO PHQ9 QUESTIONS 1 AND 2: 0

## 2024-05-13 ASSESSMENT — ENCOUNTER SYMPTOMS
OCCASIONAL FEELINGS OF UNSTEADINESS: 0
LOSS OF SENSATION IN FEET: 0
DEPRESSION: 0

## 2024-05-13 NOTE — PROGRESS NOTES
Subjective   Patient ID: Toña Machuca is a 38 y.o. female who is with chief complaint of right sided lower back pain.    HPI  Patient is a 38 y.o. female who CONSULTED AT [facility] today. Patient is with complaints of right sided lower back pain. Patient states condition started yesterday after suddenly standing up from sitting position. Patient states the pain is on the right side of her lower back, spasm / achy in character, wax and wane but always there, 7/10, aggravated by movement, and non radiating. she denies fever, chills, paresthesia, paralysis, nor change in the color of the skin or nails of involved extremity. she states she tried OTC medications which afforded only slight relief of symptoms.    Review of Systems  General: no weight loss, generally healthy, no fatigue  Head:  no headaches / sinus pain, no vertigo, no injury  Eyes: no diplopia, no tearing, no pain,   Ears: no change in hearing, no tinnitus, no bleeding, no vertigo  Mouth:  no dental difficulties, no gingival bleeding, no sore throat, no loss of sense of taste  Nose: no congestion, no  discharge, no bleeding, no obstruction, no loss of sense of smell  Neck: no stiffness, no pain, no tenderness, no masses, no bruit  Pulmonary: no dyspnea, no wheezing, no hemoptysis, no cough  Cardiovascular: no chest pain, no palpitations, no syncope, no orthopnea  Gastrointestinal: no change in appetite, no dysphagia, no abdominal pains, no diarrhea, no emesis, no melena  Genito Urinary: no dysuria, no urinary urgency, no nocturia, no incontinence, no change in nature of urine  Musculoskeletal: (+) right sided lower back pain, no limitation of range of motion, no paresthesia, no numbness  Constitutional: no fever, no chills, no night sweats    Objective   Physical Exam  General: ambulatory, in no acute distress  Head: normocephalic, no lesions  Neck: supple, no masses, no bruits  Musculoskeletal: no limitation of range of  motion, no paralysis, no deformity; BacK: (+) direct tenderness on the right paravertebral muscle area level of L3 to L5, negative straight leg raise test on both right and left sides,  Extremities: full and equal peripheral pulses, no edema, < 2 seconds capillary refill on all toes    Assessment/Plan   Problem List Items Addressed This Visit    None  Visit Diagnoses         Codes    Acute right-sided low back pain without sciatica    -  Primary M54.50    Relevant Medications    methocarbamol (Robaxin) 500 mg tablet    diclofenac (Voltaren) 75 mg EC tablet    Muscle spasm     M62.838    Relevant Medications    methocarbamol (Robaxin) 500 mg tablet    diclofenac (Voltaren) 75 mg EC tablet    BMI 45.0-49.9, adult (Multi)     Z68.42    Class 3 severe obesity with serious comorbidity and body mass index (BMI) of 45.0 to 49.9 in adult, unspecified obesity type (Multi)     E66.01, Z68.42        DISCHARGE SUMMARY:   Patient was seen and examined. Diagnosis, treatment, treatment options, and possible complications of today's illness discussed and explained to patient. Patient to take medication/s associated with this visit.  Patient may use OTC menthol patches as needed for comfort. Advised stretching and warm up exercises as tolerated prior to more intense physical exertion / exercise.  Advised to avoid heavy lifting, pulling, or pushing for at least a week. Reinforce stretching and exercises that strengthen core muscles.     Patient to come back in 4 - 7 days if needed for worsening symptoms. Patient verbalized understanding of plan of care.           SHIRIN Pablo-CNP 05/13/24 12:06 PM

## 2024-05-13 NOTE — PROGRESS NOTES
"Subjective   Patient ID: Toña Machuca is a 38 y.o. female who presents for Back Pain.      Symptoms:  mid back, pt states she heard a pop  Length of symptoms: yesterday  OTC: motrin with mild help.  Related information:    HPI     Review of Systems    Objective   /83 Comment: auto  Pulse 78   Temp 36.5 °C (97.7 °F)   Resp 16   Ht 1.549 m (5' 1\")   Wt 109 kg (240 lb)   SpO2 98%   BMI 45.35 kg/m²     Physical Exam    Assessment/Plan          "

## 2024-05-21 ENCOUNTER — HOSPITAL ENCOUNTER (EMERGENCY)
Facility: HOSPITAL | Age: 39
Discharge: HOME | End: 2024-05-21
Attending: EMERGENCY MEDICINE
Payer: COMMERCIAL

## 2024-05-21 VITALS
BODY MASS INDEX: 45.31 KG/M2 | OXYGEN SATURATION: 100 % | HEART RATE: 85 BPM | TEMPERATURE: 97.9 F | HEIGHT: 61 IN | SYSTOLIC BLOOD PRESSURE: 157 MMHG | DIASTOLIC BLOOD PRESSURE: 95 MMHG | WEIGHT: 240 LBS | RESPIRATION RATE: 18 BRPM

## 2024-05-21 DIAGNOSIS — L02.31 ABSCESS OF RIGHT BUTTOCK: Primary | ICD-10-CM

## 2024-05-21 PROCEDURE — 10060 I&D ABSCESS SIMPLE/SINGLE: CPT

## 2024-05-21 PROCEDURE — 99284 EMERGENCY DEPT VISIT MOD MDM: CPT | Performed by: EMERGENCY MEDICINE

## 2024-05-21 PROCEDURE — 2500000005 HC RX 250 GENERAL PHARMACY W/O HCPCS: Performed by: EMERGENCY MEDICINE

## 2024-05-21 PROCEDURE — 99283 EMERGENCY DEPT VISIT LOW MDM: CPT

## 2024-05-21 RX ORDER — LIDOCAINE HYDROCHLORIDE AND EPINEPHRINE 10; 10 MG/ML; UG/ML
5 INJECTION, SOLUTION INFILTRATION; PERINEURAL ONCE
Status: COMPLETED | OUTPATIENT
Start: 2024-05-21 | End: 2024-05-21

## 2024-05-21 RX ORDER — DOXYCYCLINE 100 MG/1
100 CAPSULE ORAL 2 TIMES DAILY
Qty: 20 CAPSULE | Refills: 0 | Status: SHIPPED | OUTPATIENT
Start: 2024-05-21 | End: 2024-05-31

## 2024-05-21 RX ADMIN — LIDOCAINE HYDROCHLORIDE,EPINEPHRINE BITARTRATE 5 ML: 10; .01 INJECTION, SOLUTION INFILTRATION; PERINEURAL at 14:44

## 2024-05-21 ASSESSMENT — COLUMBIA-SUICIDE SEVERITY RATING SCALE - C-SSRS
2. HAVE YOU ACTUALLY HAD ANY THOUGHTS OF KILLING YOURSELF?: NO
1. IN THE PAST MONTH, HAVE YOU WISHED YOU WERE DEAD OR WISHED YOU COULD GO TO SLEEP AND NOT WAKE UP?: NO
6. HAVE YOU EVER DONE ANYTHING, STARTED TO DO ANYTHING, OR PREPARED TO DO ANYTHING TO END YOUR LIFE?: NO

## 2024-05-21 ASSESSMENT — PAIN SCALES - GENERAL: PAINLEVEL_OUTOF10: 0 - NO PAIN

## 2024-05-21 ASSESSMENT — LIFESTYLE VARIABLES
TOTAL SCORE: 0
HAVE YOU EVER FELT YOU SHOULD CUT DOWN ON YOUR DRINKING: NO
EVER FELT BAD OR GUILTY ABOUT YOUR DRINKING: NO
HAVE PEOPLE ANNOYED YOU BY CRITICIZING YOUR DRINKING: NO
EVER HAD A DRINK FIRST THING IN THE MORNING TO STEADY YOUR NERVES TO GET RID OF A HANGOVER: NO

## 2024-05-21 ASSESSMENT — PAIN - FUNCTIONAL ASSESSMENT: PAIN_FUNCTIONAL_ASSESSMENT: 0-10
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
## 2024-05-21 NOTE — Clinical Note
Toña Sven was seen and treated in our emergency department on 5/21/2024.  She may return to work on 05/23/2024.       If you have any questions or concerns, please don't hesitate to call.      Jake Tim, DO

## 2024-05-21 NOTE — DISCHARGE INSTRUCTIONS
You are discharged on Antibiotic called Doxycycline 100 mg twice daily for 10 days, take it with at least 8 ounces of water, stay in upright position for lt least 30 minutes after taking it.   Follow up with your PCP in one week   Return to ED  if you develop fever, chills, worsening pain

## 2024-06-24 ENCOUNTER — APPOINTMENT (OUTPATIENT)
Dept: ALLERGY | Facility: CLINIC | Age: 39
End: 2024-06-24
Payer: COMMERCIAL

## 2024-06-24 ENCOUNTER — LAB (OUTPATIENT)
Dept: LAB | Facility: LAB | Age: 39
End: 2024-06-24
Payer: COMMERCIAL

## 2024-06-24 VITALS
HEART RATE: 84 BPM | OXYGEN SATURATION: 98 % | WEIGHT: 242 LBS | DIASTOLIC BLOOD PRESSURE: 87 MMHG | SYSTOLIC BLOOD PRESSURE: 130 MMHG | BODY MASS INDEX: 44.53 KG/M2 | TEMPERATURE: 98.4 F | HEIGHT: 62 IN

## 2024-06-24 DIAGNOSIS — T78.1XXA ADVERSE REACTION TO FOOD, INITIAL ENCOUNTER: ICD-10-CM

## 2024-06-24 DIAGNOSIS — T50.905A ADVERSE EFFECT OF DRUG, INITIAL ENCOUNTER: ICD-10-CM

## 2024-06-24 DIAGNOSIS — T78.1XXA ADVERSE REACTION TO FOOD, INITIAL ENCOUNTER: Primary | ICD-10-CM

## 2024-06-24 DIAGNOSIS — Z88.0 HISTORY OF PENICILLIN ALLERGY: ICD-10-CM

## 2024-06-24 DIAGNOSIS — J30.1 SEASONAL ALLERGIC RHINITIS DUE TO POLLEN: ICD-10-CM

## 2024-06-24 PROCEDURE — 95004 PERQ TESTS W/ALRGNC XTRCS: CPT | Performed by: ALLERGY & IMMUNOLOGY

## 2024-06-24 PROCEDURE — 36415 COLL VENOUS BLD VENIPUNCTURE: CPT

## 2024-06-24 PROCEDURE — 86003 ALLG SPEC IGE CRUDE XTRC EA: CPT

## 2024-06-24 PROCEDURE — 95024 IQ TESTS W/ALLERGENIC XTRCS: CPT | Performed by: ALLERGY & IMMUNOLOGY

## 2024-06-24 PROCEDURE — 82785 ASSAY OF IGE: CPT

## 2024-06-24 PROCEDURE — 99204 OFFICE O/P NEW MOD 45 MIN: CPT | Performed by: ALLERGY & IMMUNOLOGY

## 2024-06-24 RX ORDER — LEVOCETIRIZINE DIHYDROCHLORIDE 5 MG/1
5 TABLET, FILM COATED ORAL DAILY
Qty: 30 TABLET | Refills: 11 | Status: SHIPPED | OUTPATIENT
Start: 2024-06-24 | End: 2025-06-24

## 2024-06-24 RX ORDER — OLOPATADINE HYDROCHLORIDE 2 MG/ML
1 SOLUTION/ DROPS OPHTHALMIC DAILY PRN
Qty: 2.5 ML | Refills: 5 | Status: SHIPPED | OUTPATIENT
Start: 2024-06-24 | End: 2025-06-24

## 2024-06-24 ASSESSMENT — ENCOUNTER SYMPTOMS
EYES NEGATIVE: 1
CARDIOVASCULAR NEGATIVE: 1
ALLERGIC/IMMUNOLOGIC NEGATIVE: 1
GASTROINTESTINAL NEGATIVE: 1
RESPIRATORY NEGATIVE: 1
MUSCULOSKELETAL NEGATIVE: 1
CONSTITUTIONAL NEGATIVE: 1
HEMATOLOGIC/LYMPHATIC NEGATIVE: 1
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
## 2024-06-24 NOTE — PATIENT INSTRUCTIONS
It was nice to meet you today     Your skin testing today was positive to weed pollen.  Please see below for environmental allergen avoidance recommendations.     Continue Nasacort (triamcinolone) 2 sprays each nostril once daily    Technique for nasal spray administration:  First, look slightly down, breathe normally, you do not need to sniff while you spray.  To use the nose spray, place the tip in your nose with the opposite hand (left hand, right side of nose, right hand, left side of nose), and aim or point toward the outside of the nose.  Do not sniff or snort medication afterwards as this can cause most of the medication to be swallowed.  Rather, dab your nose with a tissue if any runs out.    You may use levocetirizine (Xyzal) 5 mg once daily as needed    You may use olopatadine 0.2% eyedrops 1 drop each eye daily as needed    Try nasal saline sinus rinses- recommend [employer] [facility] nasal saline rinses.  Must use distilled water for this.    Please have labs drawn to evaluate shellfish allergy. Please note that some labs will come back sooner than others.  We will contact you when all labs have returned so that we can discuss results.     Recommend penicillin skin testing to further evaluate and amoxicillin challenge if negative.  You may schedule this at your convenience. Please contact our office for scheduling at [phone].   Please note that you will need to stop antihistamines for 7 days prior to this appointment and you will need to bring the medication in with you for testing.  If you do not already have a prescription from our office, please contact us prior to your challenge so that we may send it in.      Environmental Allergy Testing:  Test Results (wheal/flare in mm)    Controls  Controls  Histamine: 8x30  Negative: 0x0    Trees:  Trees  American Beech: 0x0  Federico: 0x0  Birch: 0x0  Black [place]: 0x0  [place]: 0x0  [place]: 0x0  [place]: 0x0  Elm: 0x0  Hickory: 0x0  Maple: 0x0  [place]:  0x0  [place]: 0x0  [place]: 0x0  White poplar: 0x0     Grasses:  Grass  Bahia: 0x0  Bermuda: 0x0  Jimenez: 0x0  KORT Grass Mix: 0x0  Sweet Vernal: 0x0    Weeds:  Weeds  Cocklebur: 0x0  Dandelion: 0x0  English Plantain: 0x0  Goldenrod: 4x30  Lambs Quarters: 0x0  Mugwort: 0x0  Pigweed: 0x0  Ragweed Mix: 3x20  Russian Thistle: 0x0  Yellow Dock: 0x0     Molds:  Molds  Alternaria: 0x0  Aspergillus: 0x0  Cladosporium: 0x0  Helminthosporium: 0x0  Penicillium: 0x0  Stemphyllium: 0x0    Animals/Dust Mite:  Animals  Cat: 0x0  AP dog: 0x0  Elias Dog: 0x0  Mouse: 0x0  Cockroach: 0x0  Dust Mite F: 0x0  Dust Mite P: 0x0     Intradermal allergy testing:   ID Test Results  Animals/Mites/Cockroach/Others  Dust Mite F: 0x0  Dust Mite P: 0x0    We would like to see you in follow up in 12 months or sooner if needed    Thank you for your visit to the [facility].  Part of a successful visit is taking home the information you learned today and using it to make things better.  These take home instructions are to help you, if you have questions or concerns, please contact us.     Please see below for recommendations on environmental allergy control or modification:     Pollen Avoidance--If you are sensitive to pollen, there are a few tips to help limit, but          not avoid, exposure.     Minimize outdoor activity between 5am-10am, pollen levels are highest at this time.       Keep car windows closed when traveling.     Close house windows at night, use the air conditioning if necessary.     Check the pollen count to know what pollen is outside (http://aaaai.org/nab).             Limit Cigarette smoke exposure.  Smoking and second hand smoke can irritate the          nose and sinuses.  You and the people around you will benefit from avoiding          cigarette smoke.

## 2024-06-25 LAB
CLAM IGE QN: <0.1 KU/L
CRAB IGE QN: <0.1 KU/L
LOBSTER IGE QN: <0.1 KU/L
OYSTER IGE QN: <0.1 KU/L
SCALLOP IGE QN: <0.1 KU/L
SHRIMP IGE QN: <0.1 KU/L
TOTAL IGE SMQN RAST: 17.6 KU/L

## 2024-06-26 LAB
ANNOTATION COMMENT IMP: NORMAL
BLUE MUSSEL IGE QN: <0.1 KU/L

## 2024-06-28 ENCOUNTER — TELEPHONE (OUTPATIENT)
Dept: ALLERGY | Facility: CLINIC | Age: 39
End: 2024-06-28
Payer: COMMERCIAL

## 2024-06-28 NOTE — TELEPHONE ENCOUNTER
Result Communication    Resulted Orders   Shrimp IgE   Result Value Ref Range    Shrimp IgE <0.10 <0.10 kU/L    Narrative    Interpretation Scale  <0.10 kU/L - Class 0 Allergen: ABSENT OR UNDETECTABLE ALLERGEN SPECIFIC IgE  0.10-0.34 kU/L - Class 0/1 Allergen: EQUIVOCAL LEVEL OF ALLERGEN SPECIFIC IgE  0.35-0.69 kU/L - Class 1 Allergen: LOW LEVEL OF ALLERGEN SPECIFIC IgE  0.70-3.49 kU/L - Class 2 Allergen: MODERATE LEVEL OF ALLERGEN SPECIFIC IgE  3.50-17.49 kU/L - Class 3 Allergen: HIGH LEVEL OF ALLERGEN SPECIFIC IgE  17.50-49.99 kU/L - Class 4 Allergen: VERY HIGH LEVEL OF ALLERGEN SPECIFIC IgE  50..00 kU/L - Class 5 Allergen: ULTRA HIGH LEVEL OF ALLERGEN SPECIFIC IgE  >100.00 kU/L - Class 6 Allergen: EXTREMELY HIGH LEVEL OF ALLERGEN SPECIFIC IgE   Lobster IgE   Result Value Ref Range    Lobster IgE <0.10 <0.10 kU/L    Narrative    Interpretation Scale  <0.10 kU/L - Class 0 Allergen: ABSENT OR UNDETECTABLE ALLERGEN SPECIFIC IgE  0.10-0.34 kU/L - Class 0/1 Allergen: EQUIVOCAL LEVEL OF ALLERGEN SPECIFIC IgE  0.35-0.69 kU/L - Class 1 Allergen: LOW LEVEL OF ALLERGEN SPECIFIC IgE  0.70-3.49 kU/L - Class 2 Allergen: MODERATE LEVEL OF ALLERGEN SPECIFIC IgE  3.50-17.49 kU/L - Class 3 Allergen: HIGH LEVEL OF ALLERGEN SPECIFIC IgE  17.50-49.99 kU/L - Class 4 Allergen: VERY HIGH LEVEL OF ALLERGEN SPECIFIC IgE  50..00 kU/L - Class 5 Allergen: ULTRA HIGH LEVEL OF ALLERGEN SPECIFIC IgE  >100.00 kU/L - Class 6 Allergen: EXTREMELY HIGH LEVEL OF ALLERGEN SPECIFIC IgE   Crab IgE   Result Value Ref Range    Crab IgE <0.10 <0.10 kU/L    Narrative    Interpretation Scale  <0.10 kU/L - Class 0 Allergen: ABSENT OR UNDETECTABLE ALLERGEN SPECIFIC IgE  0.10-0.34 kU/L - Class 0/1 Allergen: EQUIVOCAL LEVEL OF ALLERGEN SPECIFIC IgE  0.35-0.69 kU/L - Class 1 Allergen: LOW LEVEL OF ALLERGEN SPECIFIC IgE  0.70-3.49 kU/L - Class 2 Allergen: MODERATE LEVEL OF ALLERGEN SPECIFIC IgE  3.50-17.49 kU/L - Class 3 Allergen: HIGH LEVEL OF  ALLERGEN SPECIFIC IgE  17.50-49.99 kU/L - Class 4 Allergen: VERY HIGH LEVEL OF ALLERGEN SPECIFIC IgE  50..00 kU/L - Class 5 Allergen: ULTRA HIGH LEVEL OF ALLERGEN SPECIFIC IgE  >100.00 kU/L - Class 6 Allergen: EXTREMELY HIGH LEVEL OF ALLERGEN SPECIFIC IgE   Oyster IgE   Result Value Ref Range    Oyster IgE <0.10 <0.10 kU/L    Narrative    Interpretation Scale  <0.10 kU/L - Class 0 Allergen: ABSENT OR UNDETECTABLE ALLERGEN SPECIFIC IgE  0.10-0.34 kU/L - Class 0/1 Allergen: EQUIVOCAL LEVEL OF ALLERGEN SPECIFIC IgE  0.35-0.69 kU/L - Class 1 Allergen: LOW LEVEL OF ALLERGEN SPECIFIC IgE  0.70-3.49 kU/L - Class 2 Allergen: MODERATE LEVEL OF ALLERGEN SPECIFIC IgE  3.50-17.49 kU/L - Class 3 Allergen: HIGH LEVEL OF ALLERGEN SPECIFIC IgE  17.50-49.99 kU/L - Class 4 Allergen: VERY HIGH LEVEL OF ALLERGEN SPECIFIC IgE  50..00 kU/L - Class 5 Allergen: ULTRA HIGH LEVEL OF ALLERGEN SPECIFIC IgE  >100.00 kU/L - Class 6 Allergen: EXTREMELY HIGH LEVEL OF ALLERGEN SPECIFIC IgE   Clams IgE   Result Value Ref Range    Clam IgE <0.10 <0.10 kU/L    Narrative    Interpretation Scale  <0.10 kU/L - Class 0 Allergen: ABSENT OR UNDETECTABLE ALLERGEN SPECIFIC IgE  0.10-0.34 kU/L - Class 0/1 Allergen: EQUIVOCAL LEVEL OF ALLERGEN SPECIFIC IgE  0.35-0.69 kU/L - Class 1 Allergen: LOW LEVEL OF ALLERGEN SPECIFIC IgE  0.70-3.49 kU/L - Class 2 Allergen: MODERATE LEVEL OF ALLERGEN SPECIFIC IgE  3.50-17.49 kU/L - Class 3 Allergen: HIGH LEVEL OF ALLERGEN SPECIFIC IgE  17.50-49.99 kU/L - Class 4 Allergen: VERY HIGH LEVEL OF ALLERGEN SPECIFIC IgE  50..00 kU/L - Class 5 Allergen: ULTRA HIGH LEVEL OF ALLERGEN SPECIFIC IgE  >100.00 kU/L - Class 6 Allergen: EXTREMELY HIGH LEVEL OF ALLERGEN SPECIFIC IgE   Scallop IgE   Result Value Ref Range    Scallop IgE <0.10 <0.10 kU/L    Narrative    Interpretation Scale  <0.10 kU/L - Class 0 Allergen: ABSENT OR UNDETECTABLE ALLERGEN SPECIFIC IgE  0.10-0.34 kU/L - Class 0/1 Allergen: EQUIVOCAL LEVEL OF  ALLERGEN SPECIFIC IgE  0.35-0.69 kU/L - Class 1 Allergen: LOW LEVEL OF ALLERGEN SPECIFIC IgE  0.70-3.49 kU/L - Class 2 Allergen: MODERATE LEVEL OF ALLERGEN SPECIFIC IgE  3.50-17.49 kU/L - Class 3 Allergen: HIGH LEVEL OF ALLERGEN SPECIFIC IgE  17.50-49.99 kU/L - Class 4 Allergen: VERY HIGH LEVEL OF ALLERGEN SPECIFIC IgE  50..00 kU/L - Class 5 Allergen: ULTRA HIGH LEVEL OF ALLERGEN SPECIFIC IgE  >100.00 kU/L - Class 6 Allergen: EXTREMELY HIGH LEVEL OF ALLERGEN SPECIFIC IgE   Mussel IgE   Result Value Ref Range    Blue Mussel IgE <0.10 <=0.34 kU/L      Comment:      Performed By: [facility]  [address] [place] [id number]  : Elia Cuellar MD, PhD  CLIA Number: [id number]   Immunocap IgE   Result Value Ref Range    Immunocap IgE 17.6 <=214 KU/L      Comment:      Note: Omalizumab (Xolair, GeneRadiance; humanized  IgG1 antihuman IgE Fc) treatment does not  significantly interfere with the accuracy of  total IgE on the ImmunoCAP (TierPM) platform.  J Allergy Clin Immunol 2006;117:759-66).  Allergens, parasitic diseases, smoking, and  alcohol consumption have been reported to  increase levels of total IgE in serum.   Allergen Interpretation, Immunocap Score IgE   Result Value Ref Range    Immunocap Interpretation See Note       Comment:      REFERENCE INTERVAL: Allergen, Interpretation     Less than 0.10 kU/L......Class 0.....No significant level detected   0.10-0.34 kU/L...........Class 0/1...Clinical relevance   undetermined   0.35-0.70 kU/L...........Class 1.....Low   0.71-3.50 kU/L...........Class 2.....Moderate   3.51-17.50 kU/L..........Class 3.....High   17.51-50.00 kU/L.........Class 4.....Very High   50..00 kU/L........Class 5.....Very High   Greater than 100.00kU/L..Class 6.....Very High    Allergen results of 0.10-0.34 kU/L are intended for specialist use   as the clinical relevance is undetermined. Even though increasing   ranges are reflective of increasing  concentrations of   allergen-specific IgE, these concentrations may not correlate with   the degree of clinical response or skin testing results when   challenged with a specific allergen. The correlation of allergy   laboratory results with clinical history and in vivo reactivity to   specific allergens is essential. A negative test may not rule out    clinical allergy or even anaphylaxis.  Performed By: [facility]  [address] [place] [id number]  : Elia Cuellar MD, PhD  CLIA Number: [id number]       2:14 PM      Results were successfully communicated with the patient and they acknowledged their understanding.

## 2024-06-28 NOTE — TELEPHONE ENCOUNTER
Please let Toña know that shellfish IgE testing is negative.  She may try shellfish again, and if any concerns please let us know.

## 2024-09-10 ENCOUNTER — APPOINTMENT (OUTPATIENT)
Dept: OBSTETRICS AND GYNECOLOGY | Facility: CLINIC | Age: 39
End: 2024-09-10
Payer: COMMERCIAL

## 2024-09-10 VITALS
WEIGHT: 239.7 LBS | HEIGHT: 62 IN | DIASTOLIC BLOOD PRESSURE: 84 MMHG | SYSTOLIC BLOOD PRESSURE: 152 MMHG | BODY MASS INDEX: 44.11 KG/M2

## 2024-09-10 DIAGNOSIS — Z80.49 FAMILY HISTORY OF UTERINE CANCER: ICD-10-CM

## 2024-09-10 DIAGNOSIS — Z01.419 NORMAL GYNECOLOGIC EXAMINATION: Primary | ICD-10-CM

## 2024-09-10 DIAGNOSIS — Z97.5 IUD (INTRAUTERINE DEVICE) IN PLACE: ICD-10-CM

## 2024-09-10 DIAGNOSIS — Z80.41 FAMILY HISTORY OF OVARIAN CANCER: ICD-10-CM

## 2024-09-10 PROCEDURE — 1036F TOBACCO NON-USER: CPT | Performed by: OBSTETRICS & GYNECOLOGY

## 2024-09-10 PROCEDURE — 3077F SYST BP >= 140 MM HG: CPT | Performed by: OBSTETRICS & GYNECOLOGY

## 2024-09-10 PROCEDURE — 3008F BODY MASS INDEX DOCD: CPT | Performed by: OBSTETRICS & GYNECOLOGY

## 2024-09-10 PROCEDURE — 3079F DIAST BP 80-89 MM HG: CPT | Performed by: OBSTETRICS & GYNECOLOGY

## 2024-09-10 PROCEDURE — 99395 PREV VISIT EST AGE 18-39: CPT | Performed by: OBSTETRICS & GYNECOLOGY

## 2024-09-10 NOTE — PROGRESS NOTES
"GYNECOLOGY PROGRESS NOTE        CC:    Chief Complaint   Patient presents with    Annual Exam     Est patient - annual exam - no other issues  Pap 2021 neg w/HPV neg  Chaperone ceferino simpson ma       HPI:  Toña PRAVEENA Sven is here for a routine GYN examination.  No GYN c/o.  Doing OK with Mirena IUD, seldom gets a light period, no AUB.  No future pregnancy plans.      Depression screen:  negative  Fall screen:  negative  Domestic violence screen:  negative      ROS:  GI - no blood in BMs  URO - no hematuria  GYN - no AUB or vaginal discharge  PSYCH - mood OK      PHYSICAL EXAM:  /84 (BP Location: Left arm, Patient Position: Sitting)   Ht 1.562 m (5' 1.5\")   Wt 109 kg (239 lb 11.2 oz)   LMP 2024   BMI 44.56 kg/m²   GEN:  A&O, NAD  ABD:  NT/ND, soft, no palpable masses  URO:  normal urethra, no bladder TTP  GYN:  normal vulva and perineum w/o lesions or ulcers, normal vagina without discharge or lesions, normal cervix without lesions or discharge or CMT--tip of IUD strings seen in endocervical canal, bimanual exam--extremely limited due to body habitus but no gross masses or TTP   BREAST:  no masses or TTP, no skin lesions or nipple discharge  DERM:  multiple keloid scars but none at /myomectomy scar site, no hirsutism or acne   PSYCH:  normal affect, non-anxious      IMPRESSION/PLAN:  Problem List Items Addressed This Visit       IUD (intrauterine device) in place     Other Visit Diagnoses       Normal gynecologic examination    -  Primary           Pap and HPV normal in , no Hx of HGSIL, next due in .       IUD:  Mirena IUD in place since 2021, no issues, occasional light periods, expiration now in 8 years () discussed with patient, non-visualized strings with IUD in place on US.  Possibly earlier change out discussed if AUB returns, per patient preference.    + FMHx of uterine CA at age 38 in mother (obese) and ovarian CA in MGM at ~age 70.  Recommend genetic counseling and " possible BUTLER Syndrome genetic testing if her mother was not tested and will not get tested, best person to test is an affected individual.  Mirena IUD as a preventative for uterine CA was discussed with the patient.        Giuliano Garcia DO

## 2024-09-25 ENCOUNTER — APPOINTMENT (OUTPATIENT)
Dept: PRIMARY CARE | Facility: CLINIC | Age: 39
End: 2024-09-25
Payer: COMMERCIAL

## 2024-09-25 VITALS
DIASTOLIC BLOOD PRESSURE: 88 MMHG | HEIGHT: 62 IN | TEMPERATURE: 97.7 F | SYSTOLIC BLOOD PRESSURE: 138 MMHG | OXYGEN SATURATION: 98 % | RESPIRATION RATE: 16 BRPM | BODY MASS INDEX: 43.98 KG/M2 | WEIGHT: 239 LBS | HEART RATE: 88 BPM

## 2024-09-25 DIAGNOSIS — E55.9 VITAMIN D DEFICIENCY: ICD-10-CM

## 2024-09-25 DIAGNOSIS — Z13.220 SCREENING FOR LIPOID DISORDERS: ICD-10-CM

## 2024-09-25 DIAGNOSIS — I10 ESSENTIAL (PRIMARY) HYPERTENSION: ICD-10-CM

## 2024-09-25 DIAGNOSIS — G47.30 SLEEP APNEA, UNSPECIFIED TYPE: ICD-10-CM

## 2024-09-25 DIAGNOSIS — E66.01 CLASS 3 SEVERE OBESITY WITH BODY MASS INDEX (BMI) OF 40.0 TO 44.9 IN ADULT, UNSPECIFIED OBESITY TYPE, UNSPECIFIED WHETHER SERIOUS COMORBIDITY PRESENT: ICD-10-CM

## 2024-09-25 DIAGNOSIS — R53.83 OTHER FATIGUE: ICD-10-CM

## 2024-09-25 DIAGNOSIS — Z00.00 ANNUAL PHYSICAL EXAM: Primary | ICD-10-CM

## 2024-09-25 PROCEDURE — 99395 PREV VISIT EST AGE 18-39: CPT | Performed by: NURSE PRACTITIONER

## 2024-09-25 RX ORDER — LOSARTAN POTASSIUM 100 MG/1
100 TABLET ORAL DAILY
Qty: 30 TABLET | Refills: 11 | Status: SHIPPED | OUTPATIENT
Start: 2024-09-25

## 2024-09-25 RX ORDER — ERGOCALCIFEROL 1.25 MG/1
1 CAPSULE ORAL 2 TIMES WEEKLY
Qty: 24 CAPSULE | Refills: 3 | Status: SHIPPED | OUTPATIENT
Start: 2024-09-26

## 2024-09-25 RX ORDER — AMLODIPINE BESYLATE 5 MG/1
5 TABLET ORAL DAILY
Qty: 30 TABLET | Refills: 11 | Status: SHIPPED | OUTPATIENT
Start: 2024-09-25

## 2024-09-25 ASSESSMENT — ENCOUNTER SYMPTOMS
NAUSEA: 0
SORE THROAT: 0
FEVER: 0
POLYPHAGIA: 0
HEADACHES: 0
DIZZINESS: 0
BACK PAIN: 0
WEAKNESS: 0
SLEEP DISTURBANCE: 0
POLYDIPSIA: 0
CHILLS: 0
EYE REDNESS: 0
SHORTNESS OF BREATH: 0
COUGH: 0
SINUS PRESSURE: 0
CONSTIPATION: 0
DECREASED CONCENTRATION: 0
DIARRHEA: 0
MYALGIAS: 0
PALPITATIONS: 0
VOMITING: 0
FATIGUE: 1
NERVOUS/ANXIOUS: 0
DEPRESSION: 0
DYSURIA: 0
SINUS PAIN: 0
ABDOMINAL PAIN: 0
WHEEZING: 0
LOSS OF SENSATION IN FEET: 0
OCCASIONAL FEELINGS OF UNSTEADINESS: 0

## 2024-09-25 ASSESSMENT — PATIENT HEALTH QUESTIONNAIRE - PHQ9
2. FEELING DOWN, DEPRESSED OR HOPELESS: NOT AT ALL
1. LITTLE INTEREST OR PLEASURE IN DOING THINGS: NOT AT ALL
SUM OF ALL RESPONSES TO PHQ9 QUESTIONS 1 AND 2: 0

## 2024-09-25 NOTE — PROGRESS NOTES
"Subjective   Patient ID: Toña Machuca is a 39 y.o. female who presents for Annual Exam.    Patient is being seen today for annual physical, Patient complains of constantly feeling tired. She would like to check her vit D levels.     39-year-old female (PMH: HTN, LANA, neuropathy, asthma) presents today for a physical.  She states that she is constantly feeling tired. She has been feeling fatigued over the last year. She feels it more in the afternoon. She is sleeping well at night, but admits to not using her CPAP as she felt that she constantly had a head cold when she uses it regularly.   Last Pap was with Dr. Garcia earlier this month.     Review of Systems   Constitutional:  Positive for fatigue. Negative for chills and fever.   HENT:  Negative for congestion, postnasal drip, sinus pressure, sinus pain and sore throat.    Eyes:  Negative for redness and visual disturbance.   Respiratory:  Negative for cough, shortness of breath and wheezing.    Cardiovascular:  Negative for chest pain and palpitations.   Gastrointestinal:  Negative for abdominal pain, constipation, diarrhea, nausea and vomiting.   Endocrine: Negative for polydipsia, polyphagia and polyuria.   Genitourinary:  Negative for dyspareunia, dysuria, menstrual problem, pelvic pain, urgency and vaginal discharge.   Musculoskeletal:  Negative for back pain and myalgias.   Skin:  Negative for rash.   Neurological:  Negative for dizziness, weakness and headaches.   Psychiatric/Behavioral:  Negative for decreased concentration and sleep disturbance. The patient is not nervous/anxious.        Objective   /88 (BP Location: Left arm, Patient Position: Sitting, BP Cuff Size: Large adult)   Pulse 88   Temp 36.5 °C (97.7 °F) (Temporal)   Resp 16   Ht 1.575 m (5' 2\")   Wt 108 kg (239 lb)   LMP 08/29/2024   SpO2 98%   BMI 43.71 kg/m²     Physical Exam  Vitals and nursing note reviewed.   Constitutional:       General: She is not in acute distress.   "   Appearance: Normal appearance. She is obese.   HENT:      Head: Normocephalic and atraumatic.      Right Ear: Tympanic membrane normal.      Left Ear: Tympanic membrane normal.      Nose: Nose normal. No congestion.      Mouth/Throat:      Mouth: Mucous membranes are moist.      Pharynx: Oropharynx is clear. No oropharyngeal exudate or posterior oropharyngeal erythema.   Eyes:      Extraocular Movements: Extraocular movements intact.      Conjunctiva/sclera: Conjunctivae normal.      Pupils: Pupils are equal, round, and reactive to light.   Cardiovascular:      Rate and Rhythm: Normal rate and regular rhythm.      Heart sounds: Normal heart sounds.   Pulmonary:      Effort: Pulmonary effort is normal.      Breath sounds: Normal breath sounds. No wheezing, rhonchi or rales.   Abdominal:      General: Abdomen is flat. Bowel sounds are normal.      Palpations: Abdomen is soft.      Tenderness: There is no abdominal tenderness.   Musculoskeletal:      Right lower leg: No edema.      Left lower leg: No edema.   Lymphadenopathy:      Cervical: No cervical adenopathy.   Skin:     General: Skin is warm and dry.      Capillary Refill: Capillary refill takes less than 2 seconds.   Neurological:      Mental Status: She is alert.      Motor: No weakness.      Gait: Gait normal.      Deep Tendon Reflexes: Reflexes normal.   Psychiatric:         Mood and Affect: Mood normal.         Behavior: Behavior normal.       Assessment/Plan   Problem List Items Addressed This Visit             ICD-10-CM    Vitamin D deficiency E55.9    Sleep apnea G47.30     Other Visit Diagnoses         Codes    Other fatigue    -  Primary R53.83    Essential (primary) hypertension     I10    Screening for lipoid disorders     Z13.220    Class 3 severe obesity with body mass index (BMI) of 40.0 to 44.9 in adult, unspecified obesity type, unspecified whether serious comorbidity present (Multi)     E66.01, Z68.41        Continue with current medications  for chronic health conditions.   Check labs.   Referral provided to sleep medicine for further evaluation of LANA- discussed how this may be contributing to her fatigue.  Encouraged healthy diet and regular exercise.   Follow up in 6 months for recheck, or sooner with any additional concerns.

## 2024-09-26 ENCOUNTER — LAB (OUTPATIENT)
Dept: LAB | Facility: LAB | Age: 39
End: 2024-09-26
Payer: COMMERCIAL

## 2024-09-26 DIAGNOSIS — Z13.220 SCREENING FOR LIPOID DISORDERS: ICD-10-CM

## 2024-09-26 DIAGNOSIS — R53.83 OTHER FATIGUE: ICD-10-CM

## 2024-09-26 LAB
25(OH)D3 SERPL-MCNC: 75 NG/ML (ref 30–100)
ALBUMIN SERPL BCP-MCNC: 4.1 G/DL (ref 3.4–5)
ALP SERPL-CCNC: 79 U/L (ref 33–110)
ALT SERPL W P-5'-P-CCNC: 13 U/L (ref 7–45)
ANION GAP SERPL CALC-SCNC: 12 MMOL/L (ref 10–20)
AST SERPL W P-5'-P-CCNC: 10 U/L (ref 9–39)
BASOPHILS # BLD AUTO: 0.06 X10*3/UL (ref 0–0.1)
BASOPHILS NFR BLD AUTO: 0.6 %
BILIRUB SERPL-MCNC: 0.5 MG/DL (ref 0–1.2)
BUN SERPL-MCNC: 6 MG/DL (ref 6–23)
CALCIUM SERPL-MCNC: 9.3 MG/DL (ref 8.6–10.3)
CHLORIDE SERPL-SCNC: 105 MMOL/L (ref 98–107)
CHOLEST SERPL-MCNC: 186 MG/DL (ref 0–199)
CHOLESTEROL/HDL RATIO: 5
CO2 SERPL-SCNC: 26 MMOL/L (ref 21–32)
CREAT SERPL-MCNC: 0.74 MG/DL (ref 0.5–1.05)
EGFRCR SERPLBLD CKD-EPI 2021: >90 ML/MIN/1.73M*2
EOSINOPHIL # BLD AUTO: 0.32 X10*3/UL (ref 0–0.7)
EOSINOPHIL NFR BLD AUTO: 3 %
ERYTHROCYTE [DISTWIDTH] IN BLOOD BY AUTOMATED COUNT: 13.3 % (ref 11.5–14.5)
GLUCOSE SERPL-MCNC: 103 MG/DL (ref 74–99)
HCT VFR BLD AUTO: 40.5 % (ref 36–46)
HDLC SERPL-MCNC: 37.2 MG/DL
HGB BLD-MCNC: 13.4 G/DL (ref 12–16)
IMM GRANULOCYTES # BLD AUTO: 0.02 X10*3/UL (ref 0–0.7)
IMM GRANULOCYTES NFR BLD AUTO: 0.2 % (ref 0–0.9)
LDLC SERPL CALC-MCNC: 137 MG/DL
LYMPHOCYTES # BLD AUTO: 2.53 X10*3/UL (ref 1.2–4.8)
LYMPHOCYTES NFR BLD AUTO: 23.4 %
MCH RBC QN AUTO: 27.9 PG (ref 26–34)
MCHC RBC AUTO-ENTMCNC: 33.1 G/DL (ref 32–36)
MCV RBC AUTO: 84 FL (ref 80–100)
MONOCYTES # BLD AUTO: 0.91 X10*3/UL (ref 0.1–1)
MONOCYTES NFR BLD AUTO: 8.4 %
NEUTROPHILS # BLD AUTO: 6.97 X10*3/UL (ref 1.2–7.7)
NEUTROPHILS NFR BLD AUTO: 64.4 %
NON HDL CHOLESTEROL: 149 MG/DL (ref 0–149)
NRBC BLD-RTO: 0 /100 WBCS (ref 0–0)
PLATELET # BLD AUTO: 434 X10*3/UL (ref 150–450)
POTASSIUM SERPL-SCNC: 4.1 MMOL/L (ref 3.5–5.3)
PROT SERPL-MCNC: 7.2 G/DL (ref 6.4–8.2)
RBC # BLD AUTO: 4.81 X10*6/UL (ref 4–5.2)
SODIUM SERPL-SCNC: 139 MMOL/L (ref 136–145)
TRIGL SERPL-MCNC: 57 MG/DL (ref 0–149)
TSH SERPL-ACNC: 1.68 MIU/L (ref 0.44–3.98)
VIT B12 SERPL-MCNC: 296 PG/ML (ref 211–911)
VLDL: 11 MG/DL (ref 0–40)
WBC # BLD AUTO: 10.8 X10*3/UL (ref 4.4–11.3)

## 2024-09-26 PROCEDURE — 36415 COLL VENOUS BLD VENIPUNCTURE: CPT

## 2024-09-26 PROCEDURE — 82607 VITAMIN B-12: CPT

## 2024-09-26 PROCEDURE — 80053 COMPREHEN METABOLIC PANEL: CPT

## 2024-09-26 PROCEDURE — 82306 VITAMIN D 25 HYDROXY: CPT

## 2024-09-26 PROCEDURE — 85025 COMPLETE CBC W/AUTO DIFF WBC: CPT

## 2024-09-26 PROCEDURE — 80061 LIPID PANEL: CPT

## 2024-09-26 PROCEDURE — 84443 ASSAY THYROID STIM HORMONE: CPT

## 2024-09-26 NOTE — RESULT ENCOUNTER NOTE
Please call patient. Labs showing a mildly elevated glucose and LDL cholesterol. Please recommend a low saturated fat diet and regular exercise. Follow up with any persisting symptoms after seeing sleep medicine for her sleep apnea.

## 2024-10-08 ENCOUNTER — OFFICE VISIT (OUTPATIENT)
Dept: PRIMARY CARE | Facility: CLINIC | Age: 39
End: 2024-10-08
Payer: COMMERCIAL

## 2024-10-08 VITALS
HEART RATE: 84 BPM | WEIGHT: 240 LBS | HEIGHT: 62 IN | DIASTOLIC BLOOD PRESSURE: 76 MMHG | BODY MASS INDEX: 44.16 KG/M2 | TEMPERATURE: 98.6 F | RESPIRATION RATE: 17 BRPM | SYSTOLIC BLOOD PRESSURE: 130 MMHG | OXYGEN SATURATION: 99 %

## 2024-10-08 DIAGNOSIS — R05.9 COUGH IN ADULT PATIENT: ICD-10-CM

## 2024-10-08 DIAGNOSIS — R09.81 NASAL CONGESTION WITH RHINORRHEA: ICD-10-CM

## 2024-10-08 DIAGNOSIS — J00 NASOPHARYNGITIS: Primary | ICD-10-CM

## 2024-10-08 DIAGNOSIS — J34.89 NASAL CONGESTION WITH RHINORRHEA: ICD-10-CM

## 2024-10-08 PROCEDURE — 99212 OFFICE O/P EST SF 10 MIN: CPT | Performed by: NURSE PRACTITIONER

## 2024-10-08 RX ORDER — AZITHROMYCIN 500 MG/1
500 TABLET, FILM COATED ORAL DAILY
Qty: 7 TABLET | Refills: 0 | Status: SHIPPED | OUTPATIENT
Start: 2024-10-08 | End: 2024-10-15

## 2024-10-08 ASSESSMENT — ENCOUNTER SYMPTOMS
OCCASIONAL FEELINGS OF UNSTEADINESS: 0
LOSS OF SENSATION IN FEET: 0
DEPRESSION: 0

## 2024-10-08 NOTE — PROGRESS NOTES
Subjective   Patient ID: Toña Machuca is a 39 y.o. female who presents for URI (Complains cough productive (brown/green), headaches, congestion. Onset was 3-4 days ago. ).    HPI     Review of Systems    Objective   LMP 08/29/2024     Physical Exam    Assessment/Plan

## 2024-10-08 NOTE — PROGRESS NOTES
Subjective   Patient ID: Toña Machuca is a 39 y.o. female who is with a chief complaint of symptoms of respiratory tract infection.     HPI  Patient is a 39 y.o. female who CONSULTED AT [facility] today. Patient is with complaints of nasal congestion, nasal discharge (gone now), headache, mild sinus pain, sore throat (gone now), mild cough, and fatigue. She denies having any muscle ache, loss of sense of taste, loss of sense of smell, diarrhea, chills nor fever. Patient states that present condition started about 4 days ago after being exposed to her daughter who is having cough and cold symptoms. she denies shortness of breath, chest pain, palpitations, nor edema. she stated that she  tried OTC medications which afforded only slight relief of symptoms. she denies nausea, vomiting, abdominal pain, nor any other symptoms.    Patient states she had not received any COVID vaccine yet.  Patient states she had the flu shot for this season.    Review of Systems  General: no weight loss, generally healthy, (+) fatigue  Head: (+) headache, (+) mild sinus pain, no vertigo, no injury  Eyes: no diplopia, no tearing, no pain,   Ears: no change in hearing, no tinnitus, no bleeding, no vertigo  Mouth:  no dental difficulties, no gingival bleeding, (+) hx sore throat, no loss of sense of taste  Nose: (+) congestion, (+) hx discharge, no bleeding, no obstruction, no loss of sense of smell  Neck: no stiffness, no pain, no tenderness, no masses, no bruit  Pulmonary: no dyspnea, no wheezing, no hemoptysis, no cough  Cardiovascular: no chest pain, no palpitations, no syncope, no orthopnea  Gastrointestinal: no change in appetite, no dysphagia, no abdominal pains, no diarrhea, no emesis, no melena  Genito Urinary: no dysuria, no urinary urgency, no nocturia, no incontinence, no change in nature of urine  Musculoskeletal: (+) mild muscle ache, no joint pain, no limitation of range of motion, no paresthesia,  no numbness  Constitutional: no fever, no chills, no night sweats    Objective   Physical Exam  General: ambulatory, in no acute distress  Head: normocephalic, no lesions, no sinus tenderness  Eyes: pink palpebral conjunctiva, anicteric sclerae, PERRLA, EOM's full  Ears: clear external auditory canals, no ear discharge, no bleeding from the ears, tympanic membrane intact  Nose: (+) congested nasal mucosa, (+) yellow mucoid nasal discharge, no bleeding, no obstruction  Throat: (+) erythema, and (+) exudate on posterior pharyngeal wall, no lesion  Neck: supple, no masses, no bruits, no CLADP  Chest: symmetrical chest expansion, no lagging, no retractions, clear breath sounds, no rales, no wheezes    Assessment/Plan   Problem List Items Addressed This Visit    None  Visit Diagnoses         Codes    Nasopharyngitis    -  Primary J00    Relevant Medications    azithromycin (Zithromax) 500 mg tablet    Nasal congestion with rhinorrhea     R09.81, J34.89    Relevant Medications    azithromycin (Zithromax) 500 mg tablet    Cough in adult patient     R05.9    Relevant Medications    azithromycin (Zithromax) 500 mg tablet        DISCHARGE SUMMARY:   Patient was seen and examined. Diagnosis, treatment, treatment options, and possible complications of today's illness discussed and explained to patient. Patient to take medication/s associated with this visit. Patient may take OTC decongestant of choice as needed. Patient may also take OTC analgesic/antipyretic if needed for pain/fever. Advised to increase oral fluid intake. Advised steam inhalation if needed to relieve congestion. Advised warm saline gargle if needed to relieve throat discomfort. Advised Listerine antiseptic mouthwash gargle TID. Patient may use Cepacol oral spray as needed to relieve throat discomfort. Patient was advised to discard the old toothbrush and use a new toothbrush beginning on the third of antibiotics. Advised to come back if with worsening or  persistent symptoms. Patient verbalized understanding of plan of care.    Patient to come back in 7 - 10 days if needed for worsening symptoms.           SHIRIN Pablo-CNP 10/08/24 2:32 PM

## 2024-12-30 ENCOUNTER — OFFICE VISIT (OUTPATIENT)
Dept: PRIMARY CARE | Facility: CLINIC | Age: 39
End: 2024-12-30
Payer: COMMERCIAL

## 2024-12-30 VITALS
HEART RATE: 80 BPM | TEMPERATURE: 97.6 F | BODY MASS INDEX: 44.26 KG/M2 | OXYGEN SATURATION: 98 % | DIASTOLIC BLOOD PRESSURE: 84 MMHG | WEIGHT: 242 LBS | SYSTOLIC BLOOD PRESSURE: 136 MMHG

## 2024-12-30 DIAGNOSIS — J00 NASOPHARYNGITIS: ICD-10-CM

## 2024-12-30 DIAGNOSIS — E66.813 CLASS 3 SEVERE OBESITY WITH SERIOUS COMORBIDITY AND BODY MASS INDEX (BMI) OF 40.0 TO 44.9 IN ADULT, UNSPECIFIED OBESITY TYPE: ICD-10-CM

## 2024-12-30 DIAGNOSIS — E66.01 CLASS 3 SEVERE OBESITY WITH SERIOUS COMORBIDITY AND BODY MASS INDEX (BMI) OF 40.0 TO 44.9 IN ADULT, UNSPECIFIED OBESITY TYPE: ICD-10-CM

## 2024-12-30 DIAGNOSIS — R09.81 NASAL CONGESTION WITH RHINORRHEA: ICD-10-CM

## 2024-12-30 DIAGNOSIS — J01.00 ACUTE NON-RECURRENT MAXILLARY SINUSITIS: Primary | ICD-10-CM

## 2024-12-30 DIAGNOSIS — J34.89 NASAL CONGESTION WITH RHINORRHEA: ICD-10-CM

## 2024-12-30 PROCEDURE — 99212 OFFICE O/P EST SF 10 MIN: CPT | Performed by: NURSE PRACTITIONER

## 2024-12-30 RX ORDER — DOXYCYCLINE 100 MG/1
100 CAPSULE ORAL 2 TIMES DAILY
Qty: 20 CAPSULE | Refills: 0 | Status: SHIPPED | OUTPATIENT
Start: 2024-12-30 | End: 2025-01-09

## 2024-12-30 NOTE — PROGRESS NOTES
Subjective   Patient ID: Toña Machuca is a 39 y.o. female who is with a chief complaint of symptoms of respiratory tract infection.     HPI   Patient is a 39 y.o. female who CONSULTED AT [facility] today. Patient is with complaints of nasal congestion, nasal discharge, headache, maxillary sinus pain, throat irritation, post nasal drip, bilateral ear congestion, sneezing, productive cough, fatigue, chills and fever. She denies having any muscle ache, loss of sense of taste, loss of sense of smell, nor diarrhea. Patient states that present condition started about 6 days ago. Patient denies history of recent travel, exposure to person/people who tested positive for COVID 19, nor exposure to person/people with flu like symptoms. she denies shortness of breath, chest pain, palpitations, nor edema. she stated that she  tried OTC medications which afforded only slight relief of symptoms. she denies nausea, vomiting, abdominal pain, nor any other symptoms.    Patient states she had not received any COVID vaccine yet.  Patient states she had the flu shot for this season.  Patient states she underwent testing for COVID about 3 days ago, and the result was NEGATIVE.    Review of Systems  General: no weight loss, generally healthy, (+) fatigue  Head: (+) headache, (+) maxillary sinus pain, no vertigo, no injury  Eyes: no diplopia, no tearing, no pain,   Ears: (+) bilateral ear congestion, no tinnitus, no bleeding, no vertigo  Mouth:  no dental difficulties, no gingival bleeding, (+) throat irritation, no loss of sense of taste, (+) post nasal drip,   Nose: (+) congestion, (+) discharge, no bleeding, no obstruction, no loss of sense of smell, (+) sneezing,   Neck: no stiffness, no pain, no tenderness, no masses, no bruit  Pulmonary: no dyspnea, no wheezing, no hemoptysis, (+) productive cough  Cardiovascular: no chest pain, no palpitations, no syncope, no orthopnea  Gastrointestinal: no change in  appetite, no dysphagia, no abdominal pains, no diarrhea, no emesis, no melena  Genito Urinary: no dysuria, no urinary urgency, no nocturia, no incontinence, no change in nature of urine  Musculoskeletal: no muscle ache, no joint pain, no limitation of range of motion, no paresthesia, no numbness  Constitutional: (+) fever, (+) chills, no night sweats    Objective   /84   Pulse 80   Temp 36.4 °C (97.6 °F)   Wt 110 kg (242 lb)   SpO2 98%   BMI 44.26 kg/m²     Physical Exam  General: ambulatory, in no acute distress  Head: normocephalic, no lesions, (+) maxillary sinus tenderness  Eyes: pink palpebral conjunctiva, anicteric sclerae, PERRLA, EOM's full  Ears: clear external auditory canals, no ear discharge, no bleeding from the ears, tympanic membrane intact  Nose: (+) congested nasal mucosa, (+) yellow mucoid nasal discharge, no bleeding, no obstruction  Throat: (+) erythema, and (+) exudate on posterior pharyngeal wall, no lesion  Neck: supple, no masses, no bruits, no CLADP  Chest: symmetrical chest expansion, no lagging, no retractions, clear breath sounds, no rales, no wheezes    Assessment/Plan   Problem List Items Addressed This Visit    None  Visit Diagnoses         Codes    Acute non-recurrent maxillary sinusitis    -  Primary J01.00    Relevant Medications    doxycycline (Vibramycin) 100 mg capsule    Nasal congestion with rhinorrhea     R09.81, J34.89    Relevant Medications    doxycycline (Vibramycin) 100 mg capsule    Nasopharyngitis     J00    Relevant Medications    doxycycline (Vibramycin) 100 mg capsule    BMI 40.0-44.9, adult (Multi)     Z68.41    Class 3 severe obesity with serious comorbidity and body mass index (BMI) of 40.0 to 44.9 in adult, unspecified obesity type     E66.813, E66.01, Z68.41        DISCHARGE SUMMARY:   Patient was seen and examined. Diagnosis, treatment, treatment options, and possible complications of today's illness discussed and explained to patient. Patient to  take medication/s associated with this visit. Patient may take OTC decongestant of choice as needed. Patient may also take OTC analgesic/antipyretic if needed for pain/fever. Advised to increase oral fluid intake. Advised steam inhalation if needed to relieve congestion. Advised warm saline gargle if needed to relieve throat discomfort. Advised Listerine antiseptic mouthwash gargle TID. Patient may use Cepacol oral spray as needed to relieve throat discomfort. Patient was advised to discard the old toothbrush and use a new toothbrush beginning on the third of antibiotics. Advised to come back if with worsening or persistent symptoms. Patient verbalized understanding of plan of care.    Patient to come back in 7 - 10 days if needed for worsening symptoms.

## 2024-12-30 NOTE — PROGRESS NOTES
Subjective   Patient ID: Toña Machuca is a 39 y.o. female who presents for No chief complaint on file..    Pt c.o congestion, runny nose, bilateral ear pain, hot and cold sensations, fatigue, cough, sneezing OTC NONE. Pt states she has been keeping her self hydrated.      Has tested Neg for COVID saturday          Review of Systems    Objective   There were no vitals taken for this visit.    Physical Exam    Assessment/Plan

## 2024-12-31 ENCOUNTER — APPOINTMENT (OUTPATIENT)
Dept: SLEEP MEDICINE | Facility: CLINIC | Age: 39
End: 2024-12-31
Payer: COMMERCIAL

## 2024-12-31 ASSESSMENT — ENCOUNTER SYMPTOMS
DEPRESSION: 0
LOSS OF SENSATION IN FEET: 0
OCCASIONAL FEELINGS OF UNSTEADINESS: 0

## 2024-12-31 NOTE — PATIENT INSTRUCTIONS
DISCHARGE SUMMARY:   Patient was seen and examined. Diagnosis, treatment, treatment options, and possible complications of today's illness discussed and explained to patient. Patient to take medication/s associated with this visit. Patient may take OTC decongestant of choice as needed. Patient may also take OTC analgesic/antipyretic if needed for pain/fever. Advised to increase oral fluid intake. Advised steam inhalation if needed to relieve congestion. Advised warm saline gargle if needed to relieve throat discomfort. Advised Listerine antiseptic mouthwash gargle TID. Patient may use Cepacol oral spray as needed to relieve throat discomfort. Patient was advised to discard the old toothbrush and use a new toothbrush beginning on the third of antibiotics. Advised to come back if with worsening or persistent symptoms. Patient verbalized understanding of plan of care.    Patient to come back in 7 - 10 days if needed for worsening symptoms.

## 2025-01-21 ENCOUNTER — OFFICE VISIT (OUTPATIENT)
Dept: PRIMARY CARE | Facility: CLINIC | Age: 40
End: 2025-01-21
Payer: COMMERCIAL

## 2025-01-21 VITALS
RESPIRATION RATE: 16 BRPM | BODY MASS INDEX: 46.44 KG/M2 | OXYGEN SATURATION: 99 % | SYSTOLIC BLOOD PRESSURE: 138 MMHG | WEIGHT: 246 LBS | HEIGHT: 61 IN | HEART RATE: 95 BPM | TEMPERATURE: 98 F | DIASTOLIC BLOOD PRESSURE: 78 MMHG

## 2025-01-21 DIAGNOSIS — J01.91 ACUTE RECURRENT SINUSITIS, UNSPECIFIED LOCATION: Primary | ICD-10-CM

## 2025-01-21 PROCEDURE — 99213 OFFICE O/P EST LOW 20 MIN: CPT | Performed by: NURSE PRACTITIONER

## 2025-01-21 RX ORDER — LEVOFLOXACIN 500 MG/1
500 TABLET, FILM COATED ORAL DAILY
Qty: 10 TABLET | Refills: 0 | Status: SHIPPED | OUTPATIENT
Start: 2025-01-21 | End: 2025-01-31

## 2025-01-21 ASSESSMENT — ENCOUNTER SYMPTOMS
DIARRHEA: 0
NAUSEA: 0
SORE THROAT: 0
CHILLS: 0
SHORTNESS OF BREATH: 0
DIZZINESS: 0
ABDOMINAL PAIN: 0
MYALGIAS: 0
FATIGUE: 0
WHEEZING: 0
COUGH: 1
PALPITATIONS: 0
SINUS PAIN: 1
VOMITING: 0
FEVER: 0
WEAKNESS: 0
HEADACHES: 0

## 2025-01-21 NOTE — PROGRESS NOTES
"Subjective   Patient ID: Toña Machuca is a 39 y.o. female who presents for URI.    Patient is being seen today for sick visit, Patient states she has been sick for over a month. She was seen a month ago for URI symptoms and was given Doxy and has not felt better at all. She states her daughter tested positive for covid 2 weeks ago.     URI   This is a recurrent problem. The current episode started more than 1 month ago. The problem has been unchanged. There has been no fever. Associated symptoms include congestion, coughing and sinus pain. Pertinent negatives include no abdominal pain, chest pain, diarrhea, ear pain, headaches, joint pain, joint swelling, nausea, rash, sore throat, vomiting or wheezing. She has tried nothing for the symptoms. The treatment provided mild relief.      Patient was treated for a sinus infection on 12/30/24 with Nate Castañeda NP with Doxycycline. She states that her symptoms improved mildly while she was taking the antibiotics, but her symptoms returned and worsened upon finishing antibiotic.     Review of Systems   Constitutional:  Negative for chills, fatigue and fever.   HENT:  Positive for congestion and sinus pain. Negative for ear pain and sore throat.    Respiratory:  Positive for cough. Negative for shortness of breath and wheezing.    Cardiovascular:  Negative for chest pain and palpitations.   Gastrointestinal:  Negative for abdominal pain, diarrhea, nausea and vomiting.   Musculoskeletal:  Negative for joint pain and myalgias.   Skin:  Negative for rash.   Neurological:  Negative for dizziness, weakness and headaches.       Objective   /78 (BP Location: Left arm, Patient Position: Sitting, BP Cuff Size: Large adult)   Pulse 95   Temp 36.7 °C (98 °F) (Temporal)   Resp 16   Ht 1.549 m (5' 1\")   Wt 112 kg (246 lb)   SpO2 99%   BMI 46.48 kg/m²     Physical Exam  Vitals and nursing note reviewed.   Constitutional:       General: She is not in acute distress.    "  Appearance: Normal appearance.   HENT:      Right Ear: Tympanic membrane normal.      Left Ear: Tympanic membrane normal.      Nose: Congestion present.      Mouth/Throat:      Pharynx: No oropharyngeal exudate or posterior oropharyngeal erythema.   Eyes:      Conjunctiva/sclera: Conjunctivae normal.   Cardiovascular:      Rate and Rhythm: Normal rate and regular rhythm.      Heart sounds: Normal heart sounds.   Pulmonary:      Effort: Pulmonary effort is normal.      Breath sounds: Normal breath sounds. No wheezing, rhonchi or rales.   Lymphadenopathy:      Cervical: No cervical adenopathy.   Skin:     General: Skin is warm and dry.   Neurological:      Mental Status: She is alert.   Psychiatric:         Mood and Affect: Mood normal.         Behavior: Behavior normal.         Assessment/Plan   Problem List Items Addressed This Visit    None  Visit Diagnoses         Codes    Acute recurrent sinusitis, unspecified location    -  Primary J01.91    Relevant Medications    levoFLOXacin (Levaquin) 500 mg tablet          Sinusitis: Start antibiotic as directed and take until gone.  Increase rest and fluids.  May take with OTC Flonase per package instructions.  Follow-up with  primary care provider in 1 week with any persisting symptoms, or sooner with any additional concerns.

## 2025-01-22 NOTE — PROGRESS NOTES
Patient: Toña Machuca  : 1985 AGE: 39 y.o. SEX:female   MRN: [id number]   Provider: HARDIK Mcnair     Location [facility]   Service Date: 2025     PCP: Erik Gaines DO   Referred by: Deedee Marie APRN-C*          [facility]  New Visit Note      HISTORY OF PRESENT ILLNESS     Toña Machuca is a 39 y.o. female with a h/o Obesity and  Asthma, History of uterine fibroid, Hypertension  who presents to [facility].    25: NPV with concerns of LANA reevaluation reports she had an in lab sleep study performed around  showing LANA, this diagnostic study not available for review today.  She was started on CPAP at this time and used it consistently for about 3 months.  She then used intermittently for the next couple of years.  She previously had a Lexie machine that was recalled, she did not receive a replacement device.  She stopped use due to getting frequent head colds and her previous sleep provider left his practice.  She would now like to get reestablished and back to CPAP use.  Reports weight gain, loud snoring, occasional choking, unrefreshed sleep, and EDS.  ----> PSG ordered     SLEEP-WAKE SCHEDULE    Sleep Patterns: She does not have a usual bed partner. In terms of the patient's sleep/wake cycle, she generally gets into bed at approximately 9 PM. Watches TV in bed until about midnight.  her latency to sleep onset after lights out is quick. During the night, the patient generally awakens 1-2 times nightly. These awakenings are usually brief in duration. Final wake time on weekday mornings is around 5:30 AM. TST 6-7 hours/night. Naps daily 2-3:30PM which is not refreshing.     Compared to weekdays, the patient's sleep schedule is  similar on the weekends.    Breathing during sleep: snoring, gasping/choking for air, and night sweats  Behaviors at night: No   Sleep paralysis: No  "  Hypnogogic or hypnopompic hallucinations: No   Cataplexy: No     Leg symptoms and timing:  - Sensations: Patient does not have unusual sensations in their extremities that cause an urge to move them   - Movement: Patient has not been told that their legs kick or jerk during sleep    Daytime Symptoms:  On awakening patient reports: wake unrefreshed, feels sleepy, morning dry mouth, and  stimulant use (see med list)  Patient report some daytime symptoms including: DAYTIME SYMPTOMS: reports sleep inertia late to work/school due to sleepiness irritability during the day difficulty with memory or concentration during the day    Sleep environment:  Preferred sleep position: side  Room is dark: Yes  Room is quiet: Yes  Room is cool: Yes  Bed comfort: good    SLEEP HABITS  Caffeine consumption: Yes, rarely (occasional)  Alcohol consumption: Yes, rarely (occasional)  Smoking: No  Marijuana: No  Sleep aids: denies     WEIGHT: gained 15lbs in 6 months (about 30lbs since last sleep study)      ESS: 13  EDER: 9    REVIEW OF SYSTEMS     All other systems have been reviewed and are negative.    ALLERGIES     Allergies   Allergen Reactions    Other Other and Unknown    Penicillins Swelling     \"swelling\"    Prednisone Other    Latex, Natural Rubber Rash       MEDICATIONS     Current Outpatient Medications   Medication Sig Dispense Refill    albuterol 90 mcg/actuation inhaler Inhale 1 puff every 4 hours if needed.      amLODIPine (Norvasc) 5 mg tablet Take 1 tablet (5 mg) by mouth once daily. 30 tablet 11    clindamycin-benzoyl peroxide (Duac) 1.2-5% gel Clindamycin Phos-Benzoyl Perox 1.2-5 % External Gel  Quantity: 45   Refills: 0  Start: 22-Jun-2022      ergocalciferol (Vitamin D-2) 1.25 MG (84571 UT) capsule Take 1 capsule (1,250 mcg) by mouth 2 times a week. 24 capsule 3    levocetirizine (Xyzal) 5 mg tablet Take 1 tablet (5 mg) by mouth once daily. 30 tablet 11    levoFLOXacin (Levaquin) 500 mg tablet Take 1 tablet (500 mg) by " "mouth once daily for 10 days. 10 tablet 0    levonorgestrel (Mirena) 21 mcg/24 hours (8 yrs) 52 mg IUD USE AS DIRECTED      losartan (Cozaar) 100 mg tablet Take 1 tablet (100 mg) by mouth once daily. 30 tablet 11    olopatadine (Pataday) 0.2 % ophthalmic solution Administer 1 drop into affected eye(s) once daily as needed for allergies (itchy eyes). 2.5 mL 5    pantoprazole (ProtoNix) 40 mg EC tablet Take 1 tablet (40 mg) by mouth once daily. Do not crush, chew, or split. 90 tablet 3    triamcinolone (Nasacort) 55 mcg nasal inhaler Administer 1 spray into each nostril 2 times a day. 16.5 g 11    EPINEPHrine (Epipen) 0.3 mg/0.3 mL injection syringe Inject 0.3 mL (0.3 mg) into the muscle if needed for anaphylaxis for up to 1 day. Call 911 after use. 1 each 1    methocarbamol (Robaxin) 500 mg tablet Take 1 tablet (500 mg) by mouth 4 times a day as needed for muscle spasms. 40 tablet 0     No current facility-administered medications for this visit.       PAST HISTORIES     PERTINENT PAST MEDICAL HISTORY: See HPI    PERTINENT PAST SURGICAL HISTORY for Sleep Medicine:  non-contributory    PERTINENT FAMILY HISTORY for Sleep Medicine:  Patient denies family history of any sleep disorder.    PERTINENT SOCIAL HISTORY:  She  reports that she has never smoked. She has never used smokeless tobacco. Alcohol use questions deferred to the physician. She reports that she does not currently use drugs. She currently lives  with daughter and mother  .    Active Problems, Allergy List, Medication List, and PMH/PSH/FH/Social Hx have been reviewed and reconciled in chart. No significant changes unless documented in the pertinent chart section. Updates made when necessary.     PHYSICAL EXAM     VITAL SIGNS: /83   Pulse 97   Resp 18   Ht 1.562 m (5' 1.5\")   Wt 110 kg (243 lb 9.6 oz)   SpO2 96%   BMI 45.28 kg/m²     CURRENT WEIGHT:   Vitals:    01/30/25 1001   Weight: 110 kg (243 lb 9.6 oz)      PREVIOUS WEIGHTS:  Wt Readings " "from Last 3 Encounters:   01/30/25 110 kg (243 lb 9.6 oz)   01/21/25 112 kg (246 lb)   12/30/24 110 kg (242 lb)     Physical Exam  Constitutional: Awake, not in distress  Skin: Warm, no rash  Neuro: No tremors, moves all extremities  Psych: alert and oriented to time, place, and person    RESULTS/DATA     No results found for: \"IRON\", \"TRANSFERRIN\", \"IRONSAT\", \"TIBC\", \"FERRITIN\"    Bicarbonate   Date Value Ref Range Status   09/26/2024 26 21 - 32 mmol/L Final       ASSESSMENT/PLAN     Ms. Machuca is a 39 y.o. female and She was referred to the [facility] for evaluation of LANA    Problem List, Orders, Assessment, Recommendations:    #LANA  - Remote hx, no diagnostic available for review in EPIC or AEMR  - Previously on CPAP with positive benefit   - Last machine was recalled. No use for >1 yr  - Repeat sleep study for LANA reevaluation  - Will call with results and set up PAP via DME- MSC after sleep study   - Sleep apnea, PAP therapy education as well as the tips to be successful with PAP treatment was provided at length in clinic today. Patient verbalized understanding.  - Discussed 30-day mask guarantee and insurance requirement regarding PAP compliance and follow-up.   - Diet, exercise, and weight loss were emphasized today in clinic, as were non-supine sleep, avoiding alcohol in the late evening, and driving or operating heavy machinery when sleepy.       #HTN  BP Readings from Last 1 Encounters:   01/30/25 126/83     - doing well, asymptomatic, denies any headache, blurry vision, chest pain, palpitation, dizziness, lightheadedness, or syncopal episodes  - discussed at length the impact of untreated LANA and BP control  - supportive management: low salt DASH diet (less than 2000 mg sodium intake daily), moderate intensity aerobic exercise at least 30 minutes 5 days per week, reduce stress, quit smoking, limit alcohol, lose weight, and monitor BP once daily  - continue current " management and follow-up with PCP     #Obesity  BMI Readings from Last 1 Encounters:   01/30/25 45.28 kg/m²     - Encouraged healthy weight loss via diet and exercise  - Weight loss can help in the long term treatment of LANA.  - Defer management to PCP     All of patient's questions were answered. She verbalizes understanding and agreement with my assessment and plan.    Disposition    Return to clinic in 3-4 months or 31-90 days after starting PAP     I personally spent 45 minutes today (exclusive of procedures) providing care for this patient, including preparation, face to face time, EMR documentation and other services such as review of medical records, diagnostic results, patient education, counseling, and coordination of care.

## 2025-01-30 ENCOUNTER — APPOINTMENT (OUTPATIENT)
Facility: CLINIC | Age: 40
End: 2025-01-30
Payer: COMMERCIAL

## 2025-01-30 VITALS
BODY MASS INDEX: 44.83 KG/M2 | RESPIRATION RATE: 18 BRPM | HEART RATE: 97 BPM | HEIGHT: 62 IN | DIASTOLIC BLOOD PRESSURE: 83 MMHG | SYSTOLIC BLOOD PRESSURE: 126 MMHG | OXYGEN SATURATION: 96 % | WEIGHT: 243.6 LBS

## 2025-01-30 DIAGNOSIS — I10 BENIGN ESSENTIAL HTN: ICD-10-CM

## 2025-01-30 DIAGNOSIS — E66.01 MORBID OBESITY WITH BMI OF 45.0-49.9, ADULT (MULTI): ICD-10-CM

## 2025-01-30 DIAGNOSIS — G47.30 SLEEP APNEA, UNSPECIFIED TYPE: Primary | ICD-10-CM

## 2025-01-30 DIAGNOSIS — Z78.9 NONSMOKER: ICD-10-CM

## 2025-01-30 PROCEDURE — 3079F DIAST BP 80-89 MM HG: CPT | Performed by: NURSE PRACTITIONER

## 2025-01-30 PROCEDURE — 3008F BODY MASS INDEX DOCD: CPT | Performed by: NURSE PRACTITIONER

## 2025-01-30 PROCEDURE — 99204 OFFICE O/P NEW MOD 45 MIN: CPT | Performed by: NURSE PRACTITIONER

## 2025-01-30 PROCEDURE — 1036F TOBACCO NON-USER: CPT | Performed by: NURSE PRACTITIONER

## 2025-01-30 PROCEDURE — 3074F SYST BP LT 130 MM HG: CPT | Performed by: NURSE PRACTITIONER

## 2025-01-30 ASSESSMENT — COLUMBIA-SUICIDE SEVERITY RATING SCALE - C-SSRS
1. IN THE PAST MONTH, HAVE YOU WISHED YOU WERE DEAD OR WISHED YOU COULD GO TO SLEEP AND NOT WAKE UP?: NO
6. HAVE YOU EVER DONE ANYTHING, STARTED TO DO ANYTHING, OR PREPARED TO DO ANYTHING TO END YOUR LIFE?: NO
2. HAVE YOU ACTUALLY HAD ANY THOUGHTS OF KILLING YOURSELF?: NO

## 2025-01-30 ASSESSMENT — SLEEP AND FATIGUE QUESTIONNAIRES
HOW LIKELY ARE YOU TO NOD OFF OR FALL ASLEEP WHEN YOU ARE A PASSENGER IN A CAR FOR AN HOUR WITHOUT A BREAK: SLIGHT CHANCE OF DOZING
HOW LIKELY ARE YOU TO NOD OFF OR FALL ASLEEP WHILE SITTING AND READING: SLIGHT CHANCE OF DOZING
WORRIED_DISTRESSED_DUE_TO_SLEEP: SOMEWHAT
SATISFACTION_WITH_CURRENT_SLEEP_PATTERN: DISSATISFIED
SLEEP_PROBLEM_INTERFERES_DAILY_ACTIVITIES: VERY MUCH NOTICEABLE
HOW LIKELY ARE YOU TO NOD OFF OR FALL ASLEEP IN A CAR, WHILE STOPPED FOR A FEW MINUTES IN TRAFFIC: WOULD NEVER DOZE
SLEEP_PROBLEM_NOTICEABLE_TO_OTHERS: NOT AT ALL NOTICEABLE
ESS-CHAD TOTAL SCORE: 13
HOW LIKELY ARE YOU TO NOD OFF OR FALL ASLEEP WHILE SITTING QUIETLY AFTER LUNCH WITHOUT ALCOHOL: MODERATE CHANCE OF DOZING
HOW LIKELY ARE YOU TO NOD OFF OR FALL ASLEEP WHILE SITTING AND TALKING TO SOMEONE: WOULD NEVER DOZE
HOW LIKELY ARE YOU TO NOD OFF OR FALL ASLEEP WHILE LYING DOWN TO REST IN THE AFTERNOON WHEN CIRCUMSTANCES PERMIT: HIGH CHANCE OF DOZING
SITING INACTIVE IN A PUBLIC PLACE LIKE A CLASS ROOM OR A MOVIE THEATER: HIGH CHANCE OF DOZING
HOW LIKELY ARE YOU TO NOD OFF OR FALL ASLEEP WHILE WATCHING TV: HIGH CHANCE OF DOZING

## 2025-01-30 ASSESSMENT — ENCOUNTER SYMPTOMS
DEPRESSION: 0
OCCASIONAL FEELINGS OF UNSTEADINESS: 0
LOSS OF SENSATION IN FEET: 0

## 2025-01-30 NOTE — PATIENT INSTRUCTIONS
[facility] Sleep Medicine  DO [address] [place] [address] [place] [address]       NAME: Toña Machuca   DATE: 01/30/25    Your Sleep Provider Today: HARDIK Mcnair  Your Primary Care Physician: Erik Gaines DO       DIAGNOSIS:   1. Sleep apnea, unspecified type  Referral to Adult Sleep Medicine          Thank you for coming to the Sleep Medicine Clinic today! Your sleep medicine provider today was: HARDIK Mcnair Below is a summary of your treatment plan, other important information, and our contact numbers:      TREATMENT PLAN     - Get your sleep study scheduled  - Follow-up in 3-4 months.  - If not already done, sign up for 'My Chart' and send prescription requests or messages through this    Scheduling a Sleep Study    Your provider ordered a sleep apnea test today. It will usually take 2 - 3 business days for Insurance to approve the order.     Once you test is approved it will be sent to the ordering sleep lab. When the sleep lab is notified of the new order, they will reach out to you to get you scheduled for an in-lab sleep study or to get you scheduled for a pick-up date for your Home Sleep Study Kit (depending on which type of study your provider recommended).    They usually will reach out to you about 1 week after your test has been ordered. Please contact the office at [phone] if you have not heard back from them in 2 weeks after you have seen your provider. If your sleep study was ordered through  [employer] (mail away kit) you can call them at [phone] if you do not hear from them within 2 weeks.     Sleep Testing for sleep apnea: The best and ideal way to check out if you have sleep apnea is to do an overnight sleep study in the sleep laboratory. Alternatively, a home sleep apnea test can also be done depending on your insurance and risk factors.     If you are having a home sleep apnea test, kindly  allot 1 hour during pickup of the testing kit as you will have to complete paperwork and listen to the sleep technician for in-person on-the-spot demonstration and instructions on how to hook up the testing kit at home. Do the test for 1 day and start off with sleeping on your back. If you sleep on your side in the middle of night or you have always been a side or stomach-sleeper, it is ok as long as you have some time on your back and off-back.     If you are having an overnight in-lab sleep study, please make sure to bring toiletries, a comfy pillow, additional warm blankets, and any nighttime medications (include as-needed inhaler, pain pill, etc) that you may regularly take. Also, be sure to eat dinner before you arrive as we generally do not provide meals inside the sleep testing center. Lastly, in order to fall asleep faster in the sleep testing center, we advise patients to wake up 2 hours earlier on the morning of scheduled testing and avoid napping 2 days prior testing. Sometimes, your sleep provider may prescribe a sleep aid to be taken at lights out in the sleep testing center. If you are taking a sleep aid, consider having somebody pick you up after the sleep testing.    Overnight sleep studies may be scheduled on a weekday or weekend. We also perform daytime testing for shift workers on a case-by-case basis.    Once you have booked an appointment to do the sleep study, please contact my office for follow-up visit to discuss results.       IMPORTANT INFORMATION     Call 911 for medical emergencies.  Our offices are generally open from Monday-Friday, 9 am - 5 pm.  If you need to get in touch with me, you may either call me/my team (number is below) or you can use [employer].  If a referral for a test, for CPAP, or for another specialist was made, and you have not heard about scheduling this within a week, please call scheduling at [phone] ([phone]).  If you are unable to make your appointment for clinic or  "an overnight study, kindly call the office at least 48 hours in advance to cancel and reschedule.  If you are on CPAP, please bring your device's card or the device to each clinic appointment.   There are no supporting services by either the sleep doctors or their staff on weekends and Holidays, or after 5 PM on weekdays.     PRESCRIPTIONS     We require 7 days advanced notice for prescription refills. If we do not receive the request in this time, we cannot guarantee that your medication will be refilled in time.    IMPORTANT PHONE NUMBERS     [facility]: [phone]  [employer] (DME): [phone]  [employer] (DME): [phone]  [facility] (DME): [phone]    CONTACTING YOUR SLEEP MEDICINE PROVIDER AND SLEEP TEAM      For issues with your machine or mask interface, please call your DME provider first. [employer] stands for durable medical company. [employer] is the company who provides you the machine and/or PAP supplies / accessories.   To schedule, cancel, or reschedule SLEEP STUDY APPOINTMENTS, please call the Main Phone Line at [phone] - option 3.   To schedule, cancel, or reschedule CLINIC APPOINTMENTS, you can do it in \"[employer]\", call [phone] for [facility] office to speak with my on site staff, or call the Main Phone Line at [phone] - option 2  For CLINICAL QUESTIONS or MEDICATION REFILLS, please call direct line for Adult Sleep Nurses at [phone].   Lastly, you can also send a message directly to your provider through \"My Chart\", which is the email service through your  Records Account: [email or web address]     Adult Sleep Nurses (oClette Garza, CORRINE and Lori Godwin RN):  For clinical questions and refilling prescriptions: [phone]  Email sleep diaries and other documents at: [email or web address]    Office locations for Mecca Olson NP:    [address]  [place] [address]  [phone]    [facility]  [address].  [address]  [place] [address]   [phone]    OUR SLEEP TESTING LOCATIONS     Our team will contact you to schedule your sleep study, however, you can contact us as follow:  Main Phone Line (scheduling only): [phone] ([phone]), option 3    Sleep Testing Locations:   [place] (18 years and older): [address] [place] [address], 2nd floor   [place] (18 years and older): [address] [facility]; 4th floor  After hours line: [phone]  [facility] (18 years and older) at [place]: [id number] [place] [address]  After hours line: [phone]   [facility] (Main campus: All ages): [facility], 6th floor. After hours line: [phone]   [place] (5 years and older; younger considered on case-by-case basis): [address] [place] [address]; [facility] [address], [address]. Scheduling  After hours line: [phone]       Here at [facility], we wish you a restful sleep!    Your sleep medicine provider for this visit was: Mecca Olson, SHIRIN-CNP

## 2025-03-04 ENCOUNTER — APPOINTMENT (OUTPATIENT)
Dept: OTOLARYNGOLOGY | Facility: CLINIC | Age: 40
End: 2025-03-04
Payer: COMMERCIAL

## 2025-03-04 ENCOUNTER — APPOINTMENT (OUTPATIENT)
Dept: AUDIOLOGY | Facility: CLINIC | Age: 40
End: 2025-03-04
Payer: COMMERCIAL

## 2025-03-10 ENCOUNTER — OFFICE VISIT (OUTPATIENT)
Dept: PRIMARY CARE | Facility: CLINIC | Age: 40
End: 2025-03-10
Payer: COMMERCIAL

## 2025-03-10 VITALS
OXYGEN SATURATION: 98 % | RESPIRATION RATE: 16 BRPM | BODY MASS INDEX: 46.26 KG/M2 | DIASTOLIC BLOOD PRESSURE: 80 MMHG | WEIGHT: 245 LBS | HEART RATE: 85 BPM | HEIGHT: 61 IN | TEMPERATURE: 98 F | SYSTOLIC BLOOD PRESSURE: 118 MMHG

## 2025-03-10 DIAGNOSIS — H61.21 EXCESSIVE WAX IN RIGHT EAR: ICD-10-CM

## 2025-03-10 DIAGNOSIS — E66.813 CLASS 3 SEVERE OBESITY WITH SERIOUS COMORBIDITY AND BODY MASS INDEX (BMI) OF 45.0 TO 49.9 IN ADULT, UNSPECIFIED OBESITY TYPE: ICD-10-CM

## 2025-03-10 DIAGNOSIS — H61.21 IMPACTED CERUMEN OF RIGHT EAR: Primary | ICD-10-CM

## 2025-03-10 DIAGNOSIS — E66.01 CLASS 3 SEVERE OBESITY WITH SERIOUS COMORBIDITY AND BODY MASS INDEX (BMI) OF 45.0 TO 49.9 IN ADULT, UNSPECIFIED OBESITY TYPE: ICD-10-CM

## 2025-03-10 DIAGNOSIS — H93.8X1 BLOCKED EAR, RIGHT: ICD-10-CM

## 2025-03-10 PROCEDURE — 99213 OFFICE O/P EST LOW 20 MIN: CPT | Performed by: NURSE PRACTITIONER

## 2025-03-10 PROCEDURE — 69209 REMOVE IMPACTED EAR WAX UNI: CPT | Performed by: NURSE PRACTITIONER

## 2025-03-10 ASSESSMENT — PATIENT HEALTH QUESTIONNAIRE - PHQ9
2. FEELING DOWN, DEPRESSED OR HOPELESS: NOT AT ALL
SUM OF ALL RESPONSES TO PHQ9 QUESTIONS 1 AND 2: 0
1. LITTLE INTEREST OR PLEASURE IN DOING THINGS: NOT AT ALL

## 2025-03-10 ASSESSMENT — ENCOUNTER SYMPTOMS
DEPRESSION: 0
LOSS OF SENSATION IN FEET: 0
OCCASIONAL FEELINGS OF UNSTEADINESS: 0

## 2025-03-10 NOTE — PROGRESS NOTES
"Subjective   Patient ID: Toña Machuca is a 39 y.o. female who presents for Ear Fullness.    Patient is being seen today for right ear fullness, She denies having any ear pain.     Ear Fullness          Review of Systems    Objective   /82 (BP Location: Right arm, Patient Position: Sitting, BP Cuff Size: Large adult)   Pulse 85   Temp 36.7 °C (98 °F) (Temporal)   Resp 16   Ht 1.549 m (5' 1\")   Wt 111 kg (245 lb)   SpO2 98%   BMI 46.29 kg/m²     Physical Exam    Assessment/Plan          "

## 2025-03-10 NOTE — PROGRESS NOTES
Subjective   Patient ID: Toña Machuca is a 39 y.o. female who is with chief complaint of clogged sensation on the right ear.     HPI   Patient is a 39 y.o. female who CONSULTED AT [facility] today. Patient is with complaint of clogged sensation on the right ear. Patient states condition started yesterdays. she states that she had problems with excessive earwax before. she had the ear wax removal by flushing before. she denies ear discharge, tinnitus, vertigo, nasal congestion, nasal discharge, fever, nor chills.     Review of Systems  General: no weight loss, generally healthy, no fatigue  Head:  no headaches / sinus pain, no vertigo, no injury  Eyes: no diplopia, no tearing, no pain,   Ears: (+) clogged sensation on the right ear, no tinnitus, no bleeding, no vertigo  Mouth:  no dental difficulties, no gingival bleeding, no sore throat, no loss of sense of taste  Nose: no congestion, no  discharge, no bleeding, no obstruction, no loss of sense of smell  Neck: no stiffness, no pain, no tenderness, no masses, no bruit  Pulmonary: no dyspnea, no wheezing, no hemoptysis, no cough  Cardiovascular: no chest pain, no palpitations, no syncope, no orthopnea  Gastrointestinal: no change in appetite, no dysphagia, no abdominal pains, no diarrhea, no emesis, no melena  Genito Urinary: no dysuria, no urinary urgency, no nocturia, no incontinence, no change in nature of urine  Musculoskeletal: no muscle ache, no joint pain, no limitation of range of motion, no paresthesia, no numbness  Constitutional: no fever, no chills, no night sweats    Objective   Physical Exam  General: ambulatory, in no acute distress  Head: normocephalic, no lesions  Eyes: pink palpebral conjunctiva, anicteric sclerae, PERRLA, EOM's full  Ears: RIGHT EAR: no tragal pull tenderness, (+) cerumen in the EAC almost completely blocking EAC, TM not fully visible at this time due to view being obstructed by the cerumen, no  discharge, no bleeding;; LEFT EAR: clear external auditory canals, no ear discharge, no bleeding from the ears, tympanic membrane intact,  Nose: nasal mucosa normal, no nasal discharge, no bleeding, no obstruction  Throat: clear, no exudate, no lesions  Neck: supple, no masses, no bruits    Assessment/Plan   Problem List Items Addressed This Visit    None  Visit Diagnoses         Codes    Impacted cerumen of right ear    -  Primary H61.21    Relevant Orders    Ear Cerumen Removal    Excessive wax in right ear     H61.21    Relevant Orders    Ear Cerumen Removal    Blocked ear, right     H93.8X1    Relevant Orders    Ear Cerumen Removal    BMI 45.0-49.9, adult (Multi)     Z68.42    Class 3 severe obesity with serious comorbidity and body mass index (BMI) of 45.0 to 49.9 in adult, unspecified obesity type     E66.813, E66.01, Z68.42        patient underwent irrigation of right ear to remove impacted cerumen  procedure partially successful  patient tolerated procedure well    post procedure otoscopy showed EAC clear, TM intact, no tragal pull tenderness    DISCHARGE SUMMARY:   Patient was seen and examined. Diagnosis, treatment, treatment options, and possible complications of today's illness discussed and explained to patient. Patient to take medication/s associated with this visit. Patient may also take OTC analgesic/antipyretic if needed for pain/fever. Advised to refrain from swimming, diving, and air travel. Advised to avoid poking the ear (finger, cotton balls, q tips) for 1 week. Patient educated on the outcome of debrox administration. Advised to come back if with worsening or persistent symptoms. Patient verbalized understanding of plan of care.    Patient to come back in 7 - 10 days if needed for worsening symptoms.

## 2025-03-11 ENCOUNTER — APPOINTMENT (OUTPATIENT)
Dept: OPHTHALMOLOGY | Facility: CLINIC | Age: 40
End: 2025-03-11
Payer: COMMERCIAL

## 2025-03-11 DIAGNOSIS — Q12.0 CONGENITAL CATARACT OF BOTH EYES: ICD-10-CM

## 2025-03-11 DIAGNOSIS — H52.13 MYOPIA OF BOTH EYES: ICD-10-CM

## 2025-03-11 DIAGNOSIS — H10.13 ALLERGIC CONJUNCTIVITIS OF BOTH EYES: Primary | ICD-10-CM

## 2025-03-11 PROCEDURE — 92014 COMPRE OPH EXAM EST PT 1/>: CPT | Performed by: OPTOMETRIST

## 2025-03-11 PROCEDURE — 92015 DETERMINE REFRACTIVE STATE: CPT | Performed by: OPTOMETRIST

## 2025-03-11 RX ORDER — KETOTIFEN FUMARATE 0.35 MG/ML
1 SOLUTION/ DROPS OPHTHALMIC 2 TIMES DAILY
Qty: 10 ML | Refills: 11 | Status: SHIPPED | OUTPATIENT
Start: 2025-03-11 | End: 2026-03-11

## 2025-03-11 ASSESSMENT — SLIT LAMP EXAM - LIDS
COMMENTS: NORMAL
COMMENTS: NORMAL

## 2025-03-11 ASSESSMENT — ENCOUNTER SYMPTOMS
ALLERGIC/IMMUNOLOGIC NEGATIVE: 0
HEMATOLOGIC/LYMPHATIC NEGATIVE: 0
CONSTITUTIONAL NEGATIVE: 0
CARDIOVASCULAR NEGATIVE: 0
EYES NEGATIVE: 1
NEUROLOGICAL NEGATIVE: 0
ENDOCRINE NEGATIVE: 0
PSYCHIATRIC NEGATIVE: 0
MUSCULOSKELETAL NEGATIVE: 0
RESPIRATORY NEGATIVE: 0
GASTROINTESTINAL NEGATIVE: 0

## 2025-03-11 ASSESSMENT — VISUAL ACUITY
OD_CC: J1+
METHOD: SNELLEN - LINEAR
OS_SC: 20/20
OS_CC: J1+
OD_SC: 20/20

## 2025-03-11 ASSESSMENT — CONF VISUAL FIELD
OD_SUPERIOR_TEMPORAL_RESTRICTION: 0
OS_SUPERIOR_TEMPORAL_RESTRICTION: 0
OS_INFERIOR_NASAL_RESTRICTION: 0
OD_INFERIOR_NASAL_RESTRICTION: 0
OD_INFERIOR_TEMPORAL_RESTRICTION: 0
OS_INFERIOR_TEMPORAL_RESTRICTION: 0
OS_NORMAL: 1
OD_SUPERIOR_NASAL_RESTRICTION: 0
OD_NORMAL: 1
OS_SUPERIOR_NASAL_RESTRICTION: 0

## 2025-03-11 ASSESSMENT — CUP TO DISC RATIO
OD_RATIO: 0.2
OS_RATIO: 0.2

## 2025-03-11 ASSESSMENT — TONOMETRY
IOP_METHOD: GOLDMANN APPLANATION
OD_IOP_MMHG: 14
OS_IOP_MMHG: 14

## 2025-03-11 ASSESSMENT — EXTERNAL EXAM - LEFT EYE: OS_EXAM: NORMAL

## 2025-03-11 ASSESSMENT — REFRACTION_MANIFEST
OD_SPHERE: PLANO
OD_AXIS: 180
OS_SPHERE: PLANO
OD_CYLINDER: -0.50

## 2025-03-11 ASSESSMENT — EXTERNAL EXAM - RIGHT EYE: OD_EXAM: NORMAL

## 2025-03-11 NOTE — PATIENT INSTRUCTIONS
DISCHARGE SUMMARY:   Patient was seen and examined. Diagnosis, treatment, treatment options, and possible complications of today's illness discussed and explained to patient. Patient to take medication/s associated with this visit. Patient may also take OTC analgesic/antipyretic if needed for pain/fever. Advised to refrain from swimming, diving, and air travel. Advised to avoid poking the ear (finger, cotton balls, q tips) for 1 week. Patient educated on the outcome of debrox administration. Advised to come back if with worsening or persistent symptoms. Patient verbalized understanding of plan of care.    Patient to come back in 7 - 10 days if needed for worsening symptoms.

## 2025-03-11 NOTE — PROGRESS NOTES
Mild congenital cortical cataracts OU. Some c/o night time glare. Can get light tint in night time glasses.   Minimal astigmatism OD and recommend to defer glasses wear.   PAC and using Pataday/olopatadine PRN c/o burning. Use ketotifen/Alaway instead and Rx sent in.   The patient was asked to return to our clinic in one year or sooner if ocular or vision changes occur.

## 2025-03-13 ENCOUNTER — DOCUMENTATION (OUTPATIENT)
Dept: OPHTHALMOLOGY | Facility: CLINIC | Age: 40
End: 2025-03-13
Payer: COMMERCIAL

## 2025-03-13 NOTE — PROGRESS NOTES
Documented to spectacle Rx that Please provide an antiglare coating and a mild tint to the lenses as the patient is suffering from severe photophobia and these additions are medically indicated

## 2025-03-18 ENCOUNTER — CLINICAL SUPPORT (OUTPATIENT)
Dept: SLEEP MEDICINE | Facility: CLINIC | Age: 40
End: 2025-03-18
Payer: COMMERCIAL

## 2025-03-18 DIAGNOSIS — G47.33 OBSTRUCTIVE SLEEP APNEA (ADULT) (PEDIATRIC): ICD-10-CM

## 2025-03-18 DIAGNOSIS — G47.30 SLEEP APNEA, UNSPECIFIED TYPE: ICD-10-CM

## 2025-03-18 DIAGNOSIS — E66.01 MORBID OBESITY WITH BMI OF 45.0-49.9, ADULT (MULTI): ICD-10-CM

## 2025-03-18 PROCEDURE — 95810 POLYSOM 6/> YRS 4/> PARAM: CPT | Performed by: PSYCHIATRY & NEUROLOGY

## 2025-03-19 VITALS — BODY MASS INDEX: 46.2 KG/M2 | HEIGHT: 61 IN | WEIGHT: 244.71 LBS

## 2025-03-19 ASSESSMENT — SLEEP AND FATIGUE QUESTIONNAIRES
HOW LIKELY ARE YOU TO NOD OFF OR FALL ASLEEP WHILE LYING DOWN TO REST IN THE AFTERNOON WHEN CIRCUMSTANCES PERMIT: HIGH CHANCE OF DOZING
HOW LIKELY ARE YOU TO NOD OFF OR FALL ASLEEP IN A CAR, WHILE STOPPED FOR A FEW MINUTES IN TRAFFIC: WOULD NEVER DOZE
HOW LIKELY ARE YOU TO NOD OFF OR FALL ASLEEP WHEN YOU ARE A PASSENGER IN A CAR FOR AN HOUR WITHOUT A BREAK: SLIGHT CHANCE OF DOZING
SITING INACTIVE IN A PUBLIC PLACE LIKE A CLASS ROOM OR A MOVIE THEATER: MODERATE CHANCE OF DOZING
HOW LIKELY ARE YOU TO NOD OFF OR FALL ASLEEP WHILE WATCHING TV: HIGH CHANCE OF DOZING
HOW LIKELY ARE YOU TO NOD OFF OR FALL ASLEEP WHILE SITTING AND TALKING TO SOMEONE: SLIGHT CHANCE OF DOZING
ESS-CHAD TOTAL SCORE: 14
HOW LIKELY ARE YOU TO NOD OFF OR FALL ASLEEP WHILE SITTING QUIETLY AFTER LUNCH WITHOUT ALCOHOL: MODERATE CHANCE OF DOZING
HOW LIKELY ARE YOU TO NOD OFF OR FALL ASLEEP WHILE SITTING AND READING: MODERATE CHANCE OF DOZING

## 2025-03-19 NOTE — PROGRESS NOTES
[facility] TECH NOTE:     Patient: Toña Machuca   MRN//AGE: [id number]  1985  39 y.o.   Technologist: Arlyn Gasca   Room: 2   Service Date: 3/18/2025        Sleep Testing Location: [facility]     Combs: 14    TECHNOLOGIST SLEEP STUDY PROCEDURE NOTE:   This sleep study is being conducted according to the policies and procedures outlined by the AAS accreditation standards.  The sleep study procedure and processes involved during this appointment was explained to the patient, questions were answered. The patient verbalized understanding.      The patient is a 39 y.o. year old female scheduled for a Split Night  with montage of: Standard PSG.      The study that was ultimately completed was a Diagnostic PSG  with montage of: Standard PSG.    The full study Was completed.  Patient questionnaires completed?: yes     Consents signed? General and Audio- Yes, Respironics CPAP- No    Initial Fall Risk Screening:     Toña has not fallen in the last 6 months.  Toña does not have a fear of falling. She does not need assistance with sitting, standing, or walking. She does not need assistance walking in her home. She does not need assistance in an unfamiliar setting. The patient is notusing an assistive device.     Brief Study observations: Patient is a 39 year old female here for a split night study.  Study to remain PSG due to patient's refusal to sign Respironics CPAP consent.  Patient has history of LANA, but stopped using CPAP due to recall of her previous CPAP equipment.  [place] than normal sleep latency observed.  Respiratory events observed, which were worse during REM sleep.  All sleep stages observed.  Moderate to loud snore observed.  CMS scoring guidelines followed (4%) per insurance requirements.       Deviation to order/protocol and reason: No split, Refusal to sign consent for/use recalled Respironics CPAP equipment.       If PAP, which was preferred mask/pressure/mode: N/A       Other:None    After the procedure, the patient was informed to ensure followup with ordering clinician for testing results.      Technologist: NGHIA Carreon

## 2025-03-24 ENCOUNTER — TELEPHONE (OUTPATIENT)
Dept: SLEEP MEDICINE | Facility: HOSPITAL | Age: 40
End: 2025-03-24
Payer: COMMERCIAL

## 2025-03-24 DIAGNOSIS — G47.33 OSA (OBSTRUCTIVE SLEEP APNEA): Primary | ICD-10-CM

## 2025-03-31 NOTE — PROGRESS NOTES
AUDIOLOGY ADULT AUDIOMETRIC EVALUATION      Name:  Toña Machuca   :  1985  Age:  39 y.o.  Date of Evaluation:  2025    Time: 2892-5001    IMPRESSIONS     Normal hearing sensitivity in the left ear and normal hearing sensitivity  sloping to mild sensorineural hearing loss at 3000 Hz, recovering to normal hearing through 8000 Hz in the right ear.   Word recognition in quiet is excellent in both ears.  Tympanometry indicates normal middle ear pressure and tympanic membrane mobility in both ears.     RECOMMENDATIONS     Continue medical follow up with primary care provider and/or Ears Nose and Throat (ENT) provider as recommended.  Return for audiologic evaluation annually or sooner should concerns arise. The audiology department can be reached at [phone] to schedule an appointment.   Avoid exposure to loud sounds by moving away from the noise, turning down the volume, or wearing proper hearing protection correctly.    HISTORY     Reason for visit:  Ms. Machuca is seen today for an audiologic evaluation Chetna Mariee CNP.  History obtained from patient report and chart review. Today, she reports she is here for an annual test to monitor her hearing loss. She reports she had a sudden hearing loss in her right ear a couple of years ago. The hearing loss improved, but never returned to baseline. She recently has had fullness and clogged feeling in her ears and went to urgent care for treatment. They prescribed her medication to treat, but she wanted to wait until she saw audiology and ENT today for a second opinion. Patient also endorses tinnitus in the right ear only. Denied dizziness, drainage, and a change in hearing. Her last hearing test on 3/5/24 revealed normal hearing through 2000 Hz sloping to a mild notched sensorineural hearing loss at 3000 Hz, returning to normal hearing sensitivity through 8000 Hz in the right ear and normal hearing sensitivity in the left ear.     EVALUATION          TEST RESULTS     Otoscopic Evaluation:  Right Ear: Ear canal clear, tympanic membrane visualized.  Left Ear: Ear canal clear, tympanic membrane visualized.    Tympanometry (226 Hz):  Right Ear: Type A, middle ear pressure and tympanic membrane compliance within normal limits.   Left Ear: Type A, middle ear pressure and tympanic membrane compliance within normal limits.     Acoustic Reflexes:   Right Ear: (Ipsilateral) Responses present at expected levels for 500-4000 Hz.  Left Ear: (Ipsilateral) Responses present at expected levels for 500-4000 Hz.    Test technique:  Pure Tone Audiometry via insert earphones  Reliability: Good    Pure Tone Audiometry:    Right Ear: Normal hearing sensitivity 125-1000 Hz sloping to mild sensorineural hearing loss at 3000 Hz, recovering to normal hearing through 8000 Hz.  Left Ear: Normal hearing sensitivity in the left ear 125-8000 Hz    Speech Audiometry:   Right Ear:  Speech Reception Threshold (SRT) was obtained at 10 dB HL. Word Recognition scores were excellent (100%) in quiet when words were presented at 50 dB HL. These results are based on [facility] Auditory Test No.6 (NU-6) Ordered by difficulty (N=10).   Left Ear:  Speech Reception Threshold (SRT) was obtained at 5 dB HL. Word Recognition scores were excellent (100%) in quiet when words were presented at 45 dB HL. These results are based on [facility] Auditory Test No.6 (NU-6) Ordered by difficulty (N=10).     Comparison of today's results with previous test results:  Stable since last evaluation on 3/5/2024.      PATIENT EDUCATION     Discussed results and recommendations with Ms. Machuca Questions were addressed and the patient was encouraged to contact our department at [phone] should concerns arise.     JESSY Molina.   Audiology Extern    PRATIBHA Iraheta, CCC-A  Licensed Audiologist

## 2025-04-01 ENCOUNTER — CLINICAL SUPPORT (OUTPATIENT)
Dept: AUDIOLOGY | Facility: CLINIC | Age: 40
End: 2025-04-01
Payer: COMMERCIAL

## 2025-04-01 ENCOUNTER — APPOINTMENT (OUTPATIENT)
Facility: CLINIC | Age: 40
End: 2025-04-01
Payer: COMMERCIAL

## 2025-04-01 VITALS
HEIGHT: 61 IN | SYSTOLIC BLOOD PRESSURE: 134 MMHG | BODY MASS INDEX: 46.07 KG/M2 | WEIGHT: 244 LBS | DIASTOLIC BLOOD PRESSURE: 81 MMHG

## 2025-04-01 DIAGNOSIS — H92.01 OTALGIA OF RIGHT EAR: ICD-10-CM

## 2025-04-01 DIAGNOSIS — H90.41 SENSORINEURAL HEARING LOSS (SNHL) OF RIGHT EAR WITH UNRESTRICTED HEARING OF LEFT EAR: Primary | ICD-10-CM

## 2025-04-01 DIAGNOSIS — M26.609 TMJ DYSFUNCTION: ICD-10-CM

## 2025-04-01 DIAGNOSIS — H93.8X1 EAR FULLNESS, RIGHT: ICD-10-CM

## 2025-04-01 DIAGNOSIS — H90.3 ASNHL (ASYMMETRICAL SENSORINEURAL HEARING LOSS): Primary | ICD-10-CM

## 2025-04-01 PROCEDURE — 92557 COMPREHENSIVE HEARING TEST: CPT

## 2025-04-01 PROCEDURE — 92550 TYMPANOMETRY & REFLEX THRESH: CPT | Mod: 52

## 2025-04-01 NOTE — PROGRESS NOTES
Patient ID: Toña Machuca is a 39 y.o. female who presents for subsequent evaluation of right ear pain, right ear fullness, and decreased hearing in right ear.     PROVIDER IMPRESSIONS:  DIAGNOSES/PROBLEMS:  -Right otalgia  -Right ear fullness  -Sensorineural hearing loss in right ear, unrestricted hearing in left ear  -TMJ dysfunction    ASSESSMENT:   Toña Machuca is a pleasant 39 y.o. female with a history of right SSNHL (2022) and TMJ dysfunction who presents for subsequent evaluation with recent onset of right hearing loss, intermittent right otalgia, and right aural fullness. Based on the clinical information provided, symptoms and clinical exam findings are consistent with poorly-controlled TMJ dysfunction and stable sensorineural hearing loss in the right ear with unrestricted hearing in left ear. Otologic exam findings today were overall unremarkable. Reassurance provided to patient that bilateral EAC appears with no sign of acute infection or inflammation and that bilateral TM appears with no sign of infection, effusion, retraction or perforation. Audiogram was reviewed in detail with the patient today, which revealed mild SNHL in the right ear and unrestricted hearing in the left ear and normal tympanogram bilaterally. When compared to prior audiogram from March 2024, results today have remained stable in bilateral ears with no evidence of progressive hearing decline. Patient offered reassurance and counseling on the current clinical findings and management recommendations.     PLAN:  I recommended implementing the following lifestyle strategies for TMJ symptom management: Initiate a soft diet for the next 2-3 weeks and avoid eating chewy/tough foods such as gum, raw fruits/vegetables, tough meats, or anything else that requires significant mastication. Examples of appropriate soft foods include mashed potatoes, soft pasta, macaroni, yogurt, ice cream, and other things may easily be mashed with  a fork. Perform temple and cheekbone massages twice daily for several minutes by using your knuckles to gently massage in circles near temples and along your cheekbones as tolerated. Apply a warm/cold compress along the entire side of the affected face, directly over TMJ structures, once or twice daily for 20 minutes at a time and remove sooner if skin irritation occurs. I recommended patient continues consistent use of custom nighttime mouth guard from dentist to prevent jaw clenching and/or teeth grinding while asleep. If facial pain is present, may use o.t.c.  ibuprofen 600 mg three times a day for three days only with meals, advised to monitor for side effects of upset stomach and ulcers if ibuprofen is taken on an empty stomach and advised to avoid NSAIDs if previously deemed as contraindicated.   Follow-up: Patient may schedule for follow-up with me as needed. Patient is agreeable to plan. All questions answered to patient's satisfaction.     At today's visit with Toña Machuca, the number of minutes I spent providing patient care was 30. More than 50% of that time was spent counseling the patient on possible etiologies, test results, treatment options and care coordination.       Subjective   HPI: Toña Machuca is a 39 y.o. female who presents for follow-up evaluation with symptoms of ear pain, ear fullness, and right hearing loss. Reports that 1 month ago she noticed onset of decreased hearing in the right ear. She describes right hearing loss as sounds perceived as foggy/cloudy. She states that right hearing has gradually improved in the past few weeks but does not feel like hearing has returned to normal baseline function. She noticed associated symptoms of right ear fullness and an intermittent sharp shooting pain in the right ear that waxes and wanes in severity. Denies current symptoms of ear itching, tinnitus, ear drainage, autophony, dizziness and/or vertigo.  She was seen by urgent care at  "initial onset of presenting symptoms and told there was fluid in the right ear. Patient has a history of sudden-onset SNHL in the right ear that occurred in May 2022 with partial hearing recovery following right IT dexamethasone injection. She mentions a longstanding history of TMJ dysfunction and uses a custom nighttime mouthguard for nocturnal bruxism.       PATIENT HISTORY:  Past Medical History:   Diagnosis Date    Asthma     History of uterine fibroid     Hypertension     Morbid (severe) obesity due to excess calories (Multi) 10/06/2021    Obesity, morbid, BMI 40.0-49.9      Past Surgical History:   Procedure Laterality Date    BAND HEMORRHOIDECTOMY       SECTION, LOW TRANSVERSE      MR HEAD ANGIO WO IV CONTRAST  2022    MR HEAD ANGIO WO IV CONTRAST 2022 [facility]    MR NECK ANGIO WO IV CONTRAST  2022    MR NECK ANGIO WO IV CONTRAST 2022 [facility]    MYOMECTOMY  2015    open    PILONIDAL CYST DRAINAGE  2014    Pilonidal Cyst Resection      Allergies   Allergen Reactions    Other Other and Unknown    Penicillins Swelling     \"swelling\"    Prednisone Other    Latex, Natural Rubber Rash        Current Outpatient Medications:     albuterol 90 mcg/actuation inhaler, Inhale 1 puff every 4 hours if needed., Disp: , Rfl:     amLODIPine (Norvasc) 5 mg tablet, Take 1 tablet (5 mg) by mouth once daily., Disp: 30 tablet, Rfl: 11    clindamycin-benzoyl peroxide (Duac) 1.2-5% gel, Clindamycin Phos-Benzoyl Perox 1.2-5 % External Gel Quantity: 45  Refills: 0  Start: 2022, Disp: , Rfl:     EPINEPHrine (Epipen) 0.3 mg/0.3 mL injection syringe, Inject 0.3 mL (0.3 mg) into the muscle if needed for anaphylaxis for up to 1 day. Call 911 after use., Disp: 1 each, Rfl: 1    ergocalciferol (Vitamin D-2) 1.25 MG (82114 UT) capsule, Take 1 capsule (1,250 mcg) by mouth 2 times a week., Disp: 24 capsule, Rfl: 3    ketotifen (Zaditor) 0.025 % (0.035 %) ophthalmic solution, " Administer 1 drop into both eyes 2 times a day., Disp: 10 mL, Rfl: 11    levocetirizine (Xyzal) 5 mg tablet, Take 1 tablet (5 mg) by mouth once daily., Disp: 30 tablet, Rfl: 11    levonorgestrel (Mirena) 21 mcg/24 hours (8 yrs) 52 mg IUD, USE AS DIRECTED, Disp: , Rfl:     losartan (Cozaar) 100 mg tablet, Take 1 tablet (100 mg) by mouth once daily., Disp: 30 tablet, Rfl: 11    methocarbamol (Robaxin) 500 mg tablet, Take 1 tablet (500 mg) by mouth 4 times a day as needed for muscle spasms., Disp: 40 tablet, Rfl: 0    olopatadine (Pataday) 0.2 % ophthalmic solution, Administer 1 drop into affected eye(s) once daily as needed for allergies (itchy eyes). (Patient not taking: Reported on 4/1/2025), Disp: 2.5 mL, Rfl: 5    pantoprazole (ProtoNix) 40 mg EC tablet, Take 1 tablet (40 mg) by mouth once daily. Do not crush, chew, or split., Disp: 90 tablet, Rfl: 3   Tobacco Use: Low Risk  (4/1/2025)    Patient History     Smoking Tobacco Use: Never     Smokeless Tobacco Use: Never     Passive Exposure: Not on file      Alcohol Use: Not on file      Social History     Substance and Sexual Activity   Drug Use Not Currently        Review of Systems   All other systems negative.     Objective   ENT FOCUSED PHYSICAL EXAM:   TMJ: Bilateral TMJ crepitus with jaw-opening maneuver on bimanual palpation, no trismus/crepitus.  Right Ear: External inspection of ear with no deformity, scars, or masses. Mastoid is nontender. External auditory canal is clear.  TM is intact with no sign of infection, effusion, or retraction.  No perforation seen. Auto insufflation visible under microscopy.  Left Ear: External inspection of ear with no deformity, scars, or masses. Mastoid is nontender. External auditory canal is clear.  TM is intact with no sign of infection, effusion, or retraction.  No perforation seen. Auto insufflation visible under microscopy.    RESULTS:  I personally reviewed the patient's audiogram from 4/1/25, which revealed the  following results: Right ear with normal hearing through 2000 Hz, sloping to mild SNHL notched at 3000 Hz, rising to normal hearing above. Left ear with normal hearing across all frequencies. Normal tympanogram bilaterally. Excellent WRS bilaterally. Acoustic reflexes present right ipsilateral, and present left ipsilateral.   When compared to prior audiogram from 3/5/24, results have remained essentially stable in bilateral ears.     Chetna Mariee, APRN-CNP

## 2025-04-01 NOTE — TELEPHONE ENCOUNTER
----- Message from Mecca Olson sent at 3/24/2025 12:41 PM EDT -----    Can you call this patient about their in-center diagnostic sleep study and tell them they have Moderate LANA for which I have ordered APAP 5-15 CWP.  Please make sure she has compliance follow up scheduled.     Thanks,  Mecca  
Called patient and left VM regarding the availability of sleep test result.  Sleep nurse phone number [phone] - given to call back for results and plan going forward.    
Second attempt, will message on [facility]  
[Time Spent: ___ minutes] : I have spent [unfilled] minutes of time on the encounter which excludes teaching and separately reported services.
[Time Spent: ___ minutes] : I have spent [unfilled] minutes of time on the encounter which excludes teaching and separately reported services.

## 2025-05-01 ENCOUNTER — APPOINTMENT (OUTPATIENT)
Facility: CLINIC | Age: 40
End: 2025-05-01
Payer: COMMERCIAL

## 2025-05-15 DIAGNOSIS — K21.9 GASTROESOPHAGEAL REFLUX DISEASE WITHOUT ESOPHAGITIS: ICD-10-CM

## 2025-05-19 RX ORDER — PANTOPRAZOLE SODIUM 40 MG/1
40 TABLET, DELAYED RELEASE ORAL DAILY
Qty: 90 TABLET | Refills: 3 | Status: SHIPPED | OUTPATIENT
Start: 2025-05-19

## 2025-06-18 NOTE — PROGRESS NOTES
Patient: Toña Machuca  : 1985 AGE: 40 y.o. SEX:female   MRN: [id number]   Provider: HARDIK Mcnair     Location [facility]   Service Date: 2025     PCP: Erik Gaines DO   Referred by: No ref. provider found          [facility]  Follow Up Visit Note      HISTORY OF PRESENT ILLNESS     Toña Machuca is a 40 y.o. female with a h/o Obesity and  Asthma, History of uterine fibroid, Hypertension who presents to [facility].    25: First follow up since starting PAP therapy. Does not like N30i mask. Would like to try alternative mask style as mask moves around nose too freely. Comfortable with current pressure. Reports no snoring and much more refreshing sleep in comparison to before starting APAP. Wants to get back to PAP use. ----> fit with P30i, sample mask provided        25: NPV with concerns of LANA reevaluation reports she had an in lab sleep study performed around  showing LANA, this diagnostic study not available for review today.  She was started on CPAP at this time and used it consistently for about 3 months.  She then used intermittently for the next couple of years.  She previously had a Lexie machine that was recalled, she did not receive a replacement device.  She stopped use due to getting frequent head colds and her previous sleep provider left his practice.  She would now like to get reestablished and back to CPAP use.  Reports weight gain, loud snoring, occasional choking, unrefreshed sleep, and EDS.  ----> PSG ordered     SLEEP-WAKE SCHEDULE    Sleep Patterns: She does not have a usual bed partner. In terms of the patient's sleep/wake cycle, she generally gets into bed at approximately 9 PM. Watches TV in bed until about midnight.  her latency to sleep onset after lights out is quick. During the night, the patient generally awakens 1-2 times nightly. These awakenings are  "usually brief in duration. Final wake time on weekday mornings is around 5:30 AM. TST 6-7 hours/night. Naps daily 2-3:30PM which is not refreshing.     Compared to weekdays, the patient's sleep schedule is  similar on the weekends.    Breathing during sleep: snoring, gasping/choking for air, and night sweats  Behaviors at night: No   Sleep paralysis: No   Hypnogogic or hypnopompic hallucinations: No   Cataplexy: No     Leg symptoms and timing:  - Sensations: Patient does not have unusual sensations in their extremities that cause an urge to move them   - Movement: Patient has not been told that their legs kick or jerk during sleep    Daytime Symptoms:  On awakening patient reports: wake unrefreshed, feels sleepy, morning dry mouth, and  stimulant use (see med list)  Patient report some daytime symptoms including: DAYTIME SYMPTOMS: reports sleep inertia late to work/school due to sleepiness irritability during the day difficulty with memory or concentration during the day    Sleep environment:  Preferred sleep position: side  Room is dark: Yes  Room is quiet: Yes  Room is cool: Yes  Bed comfort: good    SLEEP HABITS  Caffeine consumption: Yes, rarely (occasional)  Alcohol consumption: Yes, rarely (occasional)  Smoking: No  Marijuana: No  Sleep aids: denies     WEIGHT: gained 15lbs in 6 months (about 30lbs since last sleep study)      ESS: 10    REVIEW OF SYSTEMS     All other systems have been reviewed and are negative.    ALLERGIES     Allergies   Allergen Reactions    Other Other and Unknown    Penicillins Swelling     \"swelling\"    Prednisone Other    Latex, Natural Rubber Rash       MEDICATIONS     Current Outpatient Medications   Medication Sig Dispense Refill    albuterol 90 mcg/actuation inhaler Inhale 1 puff every 4 hours if needed.      amLODIPine (Norvasc) 5 mg tablet Take 1 tablet (5 mg) by mouth once daily. 30 tablet 11    clindamycin-benzoyl peroxide (Duac) 1.2-5% gel Clindamycin Phos-Benzoyl Perox 1.2-5 " % External Gel  Quantity: 45   Refills: 0  Start: 22-Jun-2022      ergocalciferol (Vitamin D-2) 1.25 MG (29762 UT) capsule Take 1 capsule (1,250 mcg) by mouth 2 times a week. 24 capsule 3    ketotifen (Zaditor) 0.025 % (0.035 %) ophthalmic solution Administer 1 drop into both eyes 2 times a day. 10 mL 11    levocetirizine (Xyzal) 5 mg tablet Take 1 tablet (5 mg) by mouth once daily. 30 tablet 11    levonorgestrel (Mirena) 21 mcg/24 hours (8 yrs) 52 mg IUD USE AS DIRECTED      losartan (Cozaar) 100 mg tablet Take 1 tablet (100 mg) by mouth once daily. 30 tablet 11    olopatadine (Pataday) 0.2 % ophthalmic solution Administer 1 drop into affected eye(s) once daily as needed for allergies (itchy eyes). 2.5 mL 5    pantoprazole (ProtoNix) 40 mg EC tablet TAKE 1 TABLET (40 MG) BY MOUTH DAILY DO NOT CRUSH, CHEW, OR SPLIT 90 tablet 3    EPINEPHrine (Epipen) 0.3 mg/0.3 mL injection syringe Inject 0.3 mL (0.3 mg) into the muscle if needed for anaphylaxis for up to 1 day. Call 911 after use. 1 each 1    methocarbamol (Robaxin) 500 mg tablet Take 1 tablet (500 mg) by mouth 4 times a day as needed for muscle spasms. 40 tablet 0     No current facility-administered medications for this visit.       PAST HISTORIES     PERTINENT PAST MEDICAL HISTORY: See HPI    PERTINENT PAST SURGICAL HISTORY for Sleep Medicine:  non-contributory    PERTINENT FAMILY HISTORY for Sleep Medicine:  Patient denies family history of any sleep disorder.    PERTINENT SOCIAL HISTORY:  She  reports that she has never smoked. She has never used smokeless tobacco. Alcohol use questions deferred to the physician. She reports that she does not currently use drugs. She currently lives with daughter and mother .    Active Problems, Allergy List, Medication List, and PMH/PSH/FH/Social Hx have been reviewed and reconciled in chart. No significant changes unless documented in the pertinent chart section. Updates made when necessary.     PHYSICAL EXAM     VITAL  "SIGNS: /73   Resp 18   Ht 1.562 m (5' 1.5\")   Wt 112 kg (247 lb 6.4 oz)   SpO2 97%   BMI 45.99 kg/m²     CURRENT WEIGHT:   Vitals:    06/24/25 1426   Weight: 112 kg (247 lb 6.4 oz)        PREVIOUS WEIGHTS:  Wt Readings from Last 3 Encounters:   06/24/25 112 kg (247 lb 6.4 oz)   04/01/25 111 kg (244 lb)   03/19/25 111 kg (244 lb 11.4 oz)     Physical Exam  Constitutional: Awake, not in distress  Skin: Warm, no rash  Neuro: No tremors, moves all extremities  Psych: alert and oriented to time, place, and person    RESULTS/DATA     No results found for: \"IRON\", \"TRANSFERRIN\", \"IRONSAT\", \"TIBC\", \"FERRITIN\"    Bicarbonate   Date Value Ref Range Status   09/26/2024 26 21 - 32 mmol/L Final       ASSESSMENT/PLAN     Ms. Machuca is a 40 y.o. female and She was referred to the [facility] for evaluation of LANA    Problem List, Orders, Assessment, Recommendations:    #LANA  - PSG 3/18/2025-showing moderate LANA with RDI 3% 25.5, RDI 4% 20.1, SpO2 dante 79%.  Respiratory events were much worse in REM sleep.  - Retrieved and personally reviewed recent PAP adherence download data today. See HPI.  - initially with good compliance to PAP therapy; recentkly with poor compliance due to discomfort with mask, residual AHI at goal with use ----> fit with P30i, sample mask provided    - continue current setting  -  [facility] [place]  - diet, exercise, and weight loss were emphasized today in clinic, as were non-supine sleep, avoiding alcohol in the late evening, and driving or operating heavy machinery when sleepy. Patient verbalized understanding.       #HTN  BP Readings from Last 1 Encounters:   06/24/25 121/73     - doing well, asymptomatic, denies any headache, blurry vision, chest pain, palpitation, dizziness, lightheadedness, or syncopal episodes  - discussed at length the impact of untreated LANA and BP control  - supportive management: low salt DASH diet (less than 2000 mg sodium intake daily), " moderate intensity aerobic exercise at least 30 minutes 5 days per week, reduce stress, quit smoking, limit alcohol, lose weight, and monitor BP once daily  - continue current management and follow-up with PCP     #Obesity  BMI Readings from Last 1 Encounters:   06/24/25 45.99 kg/m²     - Encouraged healthy weight loss via diet and exercise  - Weight loss can help in the long term treatment of LANA.  - Defer management to PCP     All of patient's questions were answered. She verbalizes understanding and agreement with my assessment and plan.    Disposition    Return to clinic in 3-4 months     I personally spent 30 minutes today (exclusive of procedures) providing care for this patient, including preparation, face to face time, EMR documentation and other services such as review of medical records, diagnostic results, patient education, counseling, and coordination of care.

## 2025-06-24 ENCOUNTER — APPOINTMENT (OUTPATIENT)
Facility: CLINIC | Age: 40
End: 2025-06-24
Payer: COMMERCIAL

## 2025-06-24 VITALS
RESPIRATION RATE: 18 BRPM | DIASTOLIC BLOOD PRESSURE: 73 MMHG | BODY MASS INDEX: 45.53 KG/M2 | SYSTOLIC BLOOD PRESSURE: 121 MMHG | HEIGHT: 62 IN | OXYGEN SATURATION: 97 % | WEIGHT: 247.4 LBS

## 2025-06-24 DIAGNOSIS — E66.01 MORBID OBESITY WITH BMI OF 45.0-49.9, ADULT (MULTI): ICD-10-CM

## 2025-06-24 DIAGNOSIS — G47.33 OSA (OBSTRUCTIVE SLEEP APNEA): Primary | ICD-10-CM

## 2025-06-24 DIAGNOSIS — I10 BENIGN ESSENTIAL HTN: ICD-10-CM

## 2025-06-24 PROCEDURE — 3074F SYST BP LT 130 MM HG: CPT | Performed by: NURSE PRACTITIONER

## 2025-06-24 PROCEDURE — G8433 SCR FOR DEP NOT CPT DOC RSN: HCPCS | Performed by: NURSE PRACTITIONER

## 2025-06-24 PROCEDURE — 99214 OFFICE O/P EST MOD 30 MIN: CPT | Performed by: NURSE PRACTITIONER

## 2025-06-24 PROCEDURE — 3008F BODY MASS INDEX DOCD: CPT | Performed by: NURSE PRACTITIONER

## 2025-06-24 PROCEDURE — 1036F TOBACCO NON-USER: CPT | Performed by: NURSE PRACTITIONER

## 2025-06-24 PROCEDURE — 3078F DIAST BP <80 MM HG: CPT | Performed by: NURSE PRACTITIONER

## 2025-06-24 ASSESSMENT — ENCOUNTER SYMPTOMS
LOSS OF SENSATION IN FEET: 0
DEPRESSION: 0
OCCASIONAL FEELINGS OF UNSTEADINESS: 0

## 2025-06-24 ASSESSMENT — SLEEP AND FATIGUE QUESTIONNAIRES
ESS-CHAD TOTAL SCORE: 10
HOW LIKELY ARE YOU TO NOD OFF OR FALL ASLEEP WHILE SITTING QUIETLY AFTER LUNCH WITHOUT ALCOHOL: MODERATE CHANCE OF DOZING
HOW LIKELY ARE YOU TO NOD OFF OR FALL ASLEEP IN A CAR, WHILE STOPPED FOR A FEW MINUTES IN TRAFFIC: WOULD NEVER DOZE
HOW LIKELY ARE YOU TO NOD OFF OR FALL ASLEEP WHILE SITTING AND READING: WOULD NEVER DOZE
HOW LIKELY ARE YOU TO NOD OFF OR FALL ASLEEP WHILE LYING DOWN TO REST IN THE AFTERNOON WHEN CIRCUMSTANCES PERMIT: HIGH CHANCE OF DOZING
HOW LIKELY ARE YOU TO NOD OFF OR FALL ASLEEP WHEN YOU ARE A PASSENGER IN A CAR FOR AN HOUR WITHOUT A BREAK: WOULD NEVER DOZE
SITING INACTIVE IN A PUBLIC PLACE LIKE A CLASS ROOM OR A MOVIE THEATER: MODERATE CHANCE OF DOZING
HOW LIKELY ARE YOU TO NOD OFF OR FALL ASLEEP WHILE SITTING AND TALKING TO SOMEONE: WOULD NEVER DOZE
HOW LIKELY ARE YOU TO NOD OFF OR FALL ASLEEP WHILE WATCHING TV: HIGH CHANCE OF DOZING

## 2025-06-24 ASSESSMENT — COLUMBIA-SUICIDE SEVERITY RATING SCALE - C-SSRS
2. HAVE YOU ACTUALLY HAD ANY THOUGHTS OF KILLING YOURSELF?: NO
6. HAVE YOU EVER DONE ANYTHING, STARTED TO DO ANYTHING, OR PREPARED TO DO ANYTHING TO END YOUR LIFE?: NO
1. IN THE PAST MONTH, HAVE YOU WISHED YOU WERE DEAD OR WISHED YOU COULD GO TO SLEEP AND NOT WAKE UP?: NO

## 2025-06-24 ASSESSMENT — PATIENT HEALTH QUESTIONNAIRE - PHQ9
SUM OF ALL RESPONSES TO PHQ9 QUESTIONS 1 AND 2: 0
1. LITTLE INTEREST OR PLEASURE IN DOING THINGS: NOT AT ALL
2. FEELING DOWN, DEPRESSED OR HOPELESS: NOT AT ALL

## 2025-09-04 ENCOUNTER — PATIENT MESSAGE (OUTPATIENT)
Dept: ALLERGY | Facility: CLINIC | Age: 40
End: 2025-09-04
Payer: COMMERCIAL

## 2025-09-04 DIAGNOSIS — J30.1 SEASONAL ALLERGIC RHINITIS DUE TO POLLEN: ICD-10-CM

## 2025-09-04 RX ORDER — LEVOCETIRIZINE DIHYDROCHLORIDE 5 MG/1
5 TABLET, FILM COATED ORAL DAILY
Qty: 30 TABLET | Refills: 0 | Status: SHIPPED | OUTPATIENT
Start: 2025-09-04 | End: 2026-09-04

## 2025-09-10 ENCOUNTER — APPOINTMENT (OUTPATIENT)
Dept: PRIMARY CARE | Facility: CLINIC | Age: 40
End: 2025-09-10
Payer: COMMERCIAL

## 2025-09-16 ENCOUNTER — APPOINTMENT (OUTPATIENT)
Dept: OBSTETRICS AND GYNECOLOGY | Facility: CLINIC | Age: 40
End: 2025-09-16
Payer: COMMERCIAL

## 2025-09-25 ENCOUNTER — APPOINTMENT (OUTPATIENT)
Facility: CLINIC | Age: 40
End: 2025-09-25
Payer: COMMERCIAL

## 2025-11-24 ENCOUNTER — APPOINTMENT (OUTPATIENT)
Dept: ALLERGY | Facility: CLINIC | Age: 40
End: 2025-11-24
Payer: COMMERCIAL

## 2026-03-13 ENCOUNTER — APPOINTMENT (OUTPATIENT)
Dept: OPHTHALMOLOGY | Facility: CLINIC | Age: 41
End: 2026-03-13
Payer: COMMERCIAL